# Patient Record
Sex: MALE | Race: WHITE | HISPANIC OR LATINO
[De-identification: names, ages, dates, MRNs, and addresses within clinical notes are randomized per-mention and may not be internally consistent; named-entity substitution may affect disease eponyms.]

---

## 2018-08-28 ENCOUNTER — TRANSCRIPTION ENCOUNTER (OUTPATIENT)
Age: 53
End: 2018-08-28

## 2019-06-21 ENCOUNTER — TRANSCRIPTION ENCOUNTER (OUTPATIENT)
Age: 54
End: 2019-06-21

## 2023-12-31 ENCOUNTER — NON-APPOINTMENT (OUTPATIENT)
Age: 58
End: 2023-12-31

## 2024-01-30 ENCOUNTER — APPOINTMENT (OUTPATIENT)
Dept: CARDIOLOGY | Facility: CLINIC | Age: 59
End: 2024-01-30
Payer: MEDICARE

## 2024-01-30 ENCOUNTER — NON-APPOINTMENT (OUTPATIENT)
Age: 59
End: 2024-01-30

## 2024-01-30 VITALS
HEIGHT: 62 IN | SYSTOLIC BLOOD PRESSURE: 193 MMHG | BODY MASS INDEX: 31.65 KG/M2 | DIASTOLIC BLOOD PRESSURE: 96 MMHG | HEART RATE: 58 BPM | OXYGEN SATURATION: 98 % | WEIGHT: 172 LBS

## 2024-01-30 DIAGNOSIS — Z94.0 KIDNEY TRANSPLANT STATUS: ICD-10-CM

## 2024-01-30 DIAGNOSIS — Z87.891 PERSONAL HISTORY OF NICOTINE DEPENDENCE: ICD-10-CM

## 2024-01-30 DIAGNOSIS — K21.9 GASTRO-ESOPHAGEAL REFLUX DISEASE W/OUT ESOPHAGITIS: ICD-10-CM

## 2024-01-30 DIAGNOSIS — R00.2 PALPITATIONS: ICD-10-CM

## 2024-01-30 DIAGNOSIS — Z82.49 FAMILY HISTORY OF ISCHEMIC HEART DISEASE AND OTHER DISEASES OF THE CIRCULATORY SYSTEM: ICD-10-CM

## 2024-01-30 PROCEDURE — 93000 ELECTROCARDIOGRAM COMPLETE: CPT

## 2024-01-30 PROCEDURE — G2211 COMPLEX E/M VISIT ADD ON: CPT

## 2024-01-30 PROCEDURE — 99205 OFFICE O/P NEW HI 60 MIN: CPT

## 2024-01-30 RX ORDER — PANTOPRAZOLE 40 MG/1
40 TABLET, DELAYED RELEASE ORAL DAILY
Qty: 30 | Refills: 2 | Status: ACTIVE | COMMUNITY
Start: 2024-01-30

## 2024-01-30 RX ORDER — VIT B COMPLX C/FOLIC ACID/ZINC 0.8-12.5MG
TABLET ORAL DAILY
Refills: 0 | Status: ACTIVE | COMMUNITY
Start: 2024-01-30

## 2024-01-30 RX ORDER — ATORVASTATIN CALCIUM 10 MG/1
10 TABLET, FILM COATED ORAL
Refills: 0 | Status: ACTIVE | COMMUNITY
Start: 2024-01-30

## 2024-01-30 NOTE — HISTORY OF PRESENT ILLNESS
[FreeTextEntry1] : This is a 58  year old man with a history of ESRD on HD MWF, failed renal transplant, left AV fistula, s/p stent to the fistula, obesity, hypertension, hyperlipidemia who presents to the office for a cardiac evaluation.  Last week, when he finished one of his HD sessions, he felt as if his heart were racing.  He had a mild sternal chest pressure.  He was taken off the machine, and sent home, at which point he was taken to [facility].  He had negative troponin, EKG, CK, CXR.  He had a positive d dimer, and had a negative CT PA for PE.  He was discharged with follow up.  He has not had any recurrent symptoms.  No PND, orthopnea, LE swelling, dizziness, or syncope.  He has not had any recent cardiac testing.   He holds his AM labetalol on HD days as his BP gets too low with HD.  His BP is controlled on non HD days on his current dose of labetalol.

## 2024-01-30 NOTE — PHYSICAL EXAM
[Well Developed] : well developed [Well Nourished] : well nourished [No Acute Distress] : no acute distress [Normal Conjunctiva] : normal conjunctiva [Normal Venous Pressure] : normal venous pressure [No Carotid Bruit] : no carotid bruit [Normal S1, S2] : normal S1, S2 [No Rub] : no rub [No Gallop] : no gallop [Clear Lung Fields] : clear lung fields [Good Air Entry] : good air entry [No Respiratory Distress] : no respiratory distress  [Soft] : abdomen soft [Non Tender] : non-tender [No Masses/organomegaly] : no masses/organomegaly [Normal Bowel Sounds] : normal bowel sounds [Normal Gait] : normal gait [No Edema] : no edema [No Cyanosis] : no cyanosis [No Clubbing] : no clubbing [No Varicosities] : no varicosities [No Rash] : no rash [No Skin Lesions] : no skin lesions [Moves all extremities] : moves all extremities [No Focal Deficits] : no focal deficits [Normal Speech] : normal speech [Alert and Oriented] : alert and oriented [Normal memory] : normal memory [de-identified] : 1/6 systolic murmur

## 2024-01-30 NOTE — DISCUSSION/SUMMARY
[FreeTextEntry1] : This is a 58  year old man with a history of ESRD on HD MWF, failed renal transplant, left AV fistula, s/p stent to the fistula, obesity, hypertension, hyperlipidemia who presents to the office for a cardiac evaluation.  Last week, when he finished one of his HD sessions, he felt as if his heart were racing.  He had a mild sternal chest pressure.  He was taken off the machine, and sent home, at which point he was taken to [facility].  He had negative troponin, EKG, CK, CXR.  He had a positive d dimer, and had a negative CT PA for PE.  He was discharged with follow up.  I am referring him  for an echocardiogram to assess His cardiac structure and function, as well as a pharmacologic stress test to assess for the presence of obstructive coronary artery disease.  He needs a two week Zio.  He will continue labetalol 200 bid.  He will monitor his BP on non HD days to assure that it is controlled on this dose.  He will continue his statin drug. I will call him with the results.  He will follow with me in 2-3 months to review the above, and review his BP log.  [EKG obtained to assist in diagnosis and management of assessed problem(s)] : EKG obtained to assist in diagnosis and management of assessed problem(s)

## 2024-02-16 ENCOUNTER — NON-APPOINTMENT (OUTPATIENT)
Age: 59
End: 2024-02-16

## 2024-03-04 ENCOUNTER — NON-APPOINTMENT (OUTPATIENT)
Age: 59
End: 2024-03-04

## 2024-03-06 ENCOUNTER — APPOINTMENT (OUTPATIENT)
Dept: CARDIOLOGY | Facility: CLINIC | Age: 59
End: 2024-03-06

## 2024-03-06 ENCOUNTER — APPOINTMENT (OUTPATIENT)
Dept: CARDIOLOGY | Facility: CLINIC | Age: 59
End: 2024-03-06
Payer: MEDICARE

## 2024-03-06 ENCOUNTER — RX RENEWAL (OUTPATIENT)
Age: 59
End: 2024-03-06

## 2024-03-06 ENCOUNTER — NON-APPOINTMENT (OUTPATIENT)
Age: 59
End: 2024-03-06

## 2024-03-06 VITALS
OXYGEN SATURATION: 96 % | HEIGHT: 62 IN | DIASTOLIC BLOOD PRESSURE: 71 MMHG | WEIGHT: 177 LBS | BODY MASS INDEX: 32.57 KG/M2 | SYSTOLIC BLOOD PRESSURE: 120 MMHG | HEART RATE: 148 BPM

## 2024-03-06 PROCEDURE — 93000 ELECTROCARDIOGRAM COMPLETE: CPT

## 2024-03-06 PROCEDURE — 93306 TTE W/DOPPLER COMPLETE: CPT

## 2024-03-06 PROCEDURE — G2211 COMPLEX E/M VISIT ADD ON: CPT

## 2024-03-06 PROCEDURE — 99215 OFFICE O/P EST HI 40 MIN: CPT

## 2024-03-06 RX ORDER — LABETALOL HYDROCHLORIDE 200 MG/1
200 TABLET, FILM COATED ORAL TWICE DAILY
Qty: 60 | Refills: 3 | Status: DISCONTINUED | COMMUNITY
Start: 2024-01-30 | End: 2024-03-06

## 2024-03-06 NOTE — PHYSICAL EXAM
[Well Developed] : well developed [Well Nourished] : well nourished [No Acute Distress] : no acute distress [Normal Conjunctiva] : normal conjunctiva [Normal Venous Pressure] : normal venous pressure [Normal S1, S2] : normal S1, S2 [No Carotid Bruit] : no carotid bruit [No Gallop] : no gallop [No Rub] : no rub [Clear Lung Fields] : clear lung fields [Good Air Entry] : good air entry [Soft] : abdomen soft [No Respiratory Distress] : no respiratory distress  [No Masses/organomegaly] : no masses/organomegaly [Non Tender] : non-tender [Normal Bowel Sounds] : normal bowel sounds [Normal Gait] : normal gait [No Edema] : no edema [No Clubbing] : no clubbing [No Cyanosis] : no cyanosis [No Varicosities] : no varicosities [No Skin Lesions] : no skin lesions [No Rash] : no rash [No Focal Deficits] : no focal deficits [Moves all extremities] : moves all extremities [Normal Speech] : normal speech [Alert and Oriented] : alert and oriented [Normal memory] : normal memory [Tachycardia] : tachycardic [Irregularly Irregular] : irregularly irregular [I] : a grade 1

## 2024-03-12 RX ORDER — CARVEDILOL 25 MG/1
25 TABLET, FILM COATED ORAL TWICE DAILY
Qty: 180 | Refills: 3 | Status: ACTIVE | COMMUNITY
Start: 2024-03-06 | End: 1900-01-01

## 2024-03-26 ENCOUNTER — RX RENEWAL (OUTPATIENT)
Age: 59
End: 2024-03-26

## 2024-03-26 ENCOUNTER — APPOINTMENT (OUTPATIENT)
Dept: CARDIOLOGY | Facility: CLINIC | Age: 59
End: 2024-03-26
Payer: MEDICARE

## 2024-03-26 ENCOUNTER — NON-APPOINTMENT (OUTPATIENT)
Age: 59
End: 2024-03-26

## 2024-03-26 VITALS
SYSTOLIC BLOOD PRESSURE: 175 MMHG | BODY MASS INDEX: 32.39 KG/M2 | OXYGEN SATURATION: 98 % | DIASTOLIC BLOOD PRESSURE: 97 MMHG | HEIGHT: 62 IN | WEIGHT: 176 LBS | HEART RATE: 61 BPM

## 2024-03-26 PROCEDURE — 93000 ELECTROCARDIOGRAM COMPLETE: CPT

## 2024-03-26 PROCEDURE — 99215 OFFICE O/P EST HI 40 MIN: CPT

## 2024-03-26 PROCEDURE — G2211 COMPLEX E/M VISIT ADD ON: CPT

## 2024-03-26 NOTE — REVIEW OF SYSTEMS
[SOB] : no shortness of breath [Chest Discomfort] : chest discomfort [Lower Ext Edema] : no extremity edema [Leg Claudication] : no intermittent leg claudication [Palpitations] : palpitations [Orthopnea] : no orthopnea [PND] : no PND [Syncope] : no syncope [Negative] : Heme/Lymph

## 2024-03-26 NOTE — DISCUSSION/SUMMARY
[FreeTextEntry1] : This is a 59  year old man with a history of ESRD on HD MWF(uncontrolled HTN), renal transplant 3/18/14, 12/2014 failed renal transplant (on transplant list at [place]),left AV fistula, s/p stent to the fistula, obesity, hypertension, hyperlipidemia who presents to the office for a cardiac evaluation.  HE has been feeling his heart racing with HD at times.       At his last visit, he was here for a stress test, and was noted to be in AFL at 150.  He refused to go to the ED for cardioversion.  He was given carvedilol 25 bid.  He is now back in NSR.    He is going to reschedule his nuclear stress test.  He will make an appointment to see EPS.  He does not want to start blood thinners yet.  His BP is uncontrolled, and I am starting hydralazine 25 TID.     I will see him back in one month.     [EKG obtained to assist in diagnosis and management of assessed problem(s)] : EKG obtained to assist in diagnosis and management of assessed problem(s)

## 2024-03-26 NOTE — PHYSICAL EXAM
[Well Developed] : well developed [Well Nourished] : well nourished [No Acute Distress] : no acute distress [Normal Conjunctiva] : normal conjunctiva [Normal Venous Pressure] : normal venous pressure [No Carotid Bruit] : no carotid bruit [Normal S1, S2] : normal S1, S2 [No Rub] : no rub [No Gallop] : no gallop [Normal Rate] : normal [Rhythm Regular] : regular [I] : a grade 1 [Right Carotid Bruit] : no bruit heard over the right carotid [Left Carotid Bruit] : no bruit heard over the left carotid [Clear Lung Fields] : clear lung fields [Good Air Entry] : good air entry [No Respiratory Distress] : no respiratory distress  [Soft] : abdomen soft [Non Tender] : non-tender [No Masses/organomegaly] : no masses/organomegaly [Normal Bowel Sounds] : normal bowel sounds [Normal Gait] : normal gait [No Edema] : no edema [No Cyanosis] : no cyanosis [No Clubbing] : no clubbing [No Rash] : no rash [No Varicosities] : no varicosities [No Skin Lesions] : no skin lesions [Moves all extremities] : moves all extremities [No Focal Deficits] : no focal deficits [Normal Speech] : normal speech [Alert and Oriented] : alert and oriented [Normal memory] : normal memory [de-identified] : 1/6 systolic murmur

## 2024-03-26 NOTE — HISTORY OF PRESENT ILLNESS
[FreeTextEntry1] : This is a 59  year old man with a history of ESRD on HD MWF(uncontrolled HTN), renal transplant 3/18/14, 12/2014 failed renal transplant (on transplant list at [place]),left AV fistula, s/p stent to the fistula, obesity, hypertension, hyperlipidemia who presents to the office for a cardiac evaluation.  HE has been feeling his heart racing with HD at times.   He was taken off the machine, and sent home, at which point he was taken to [facility].  He had negative troponin, EKG, CK, CXR.  He had a positive d dimer, and had a negative CT PA for PE.  He was discharged with follow up.  He has not had any recurrent symptoms.     At his last visit, he was here for a stress test, and was noted to be in AFL at 150.  He refused to go to the ED for cardioversion.  He was given carvedilol 25 bid.  He is now back in NSR.      He just completed a 2 week [facility] ambulatory rhythm monitor that did not reveal any episodes of atrial fibrillation.

## 2024-03-29 ENCOUNTER — EMERGENCY (EMERGENCY)
Facility: HOSPITAL | Age: 59
LOS: 1 days | Discharge: HOME | End: 2024-03-29
Attending: EMERGENCY MEDICINE | Admitting: EMERGENCY MEDICINE
Payer: MEDICARE

## 2024-03-29 ENCOUNTER — APPOINTMENT (OUTPATIENT)
Dept: CARDIOLOGY | Facility: CLINIC | Age: 59
End: 2024-03-29
Payer: MEDICARE

## 2024-03-29 ENCOUNTER — APPOINTMENT (OUTPATIENT)
Dept: CARDIOLOGY | Facility: CLINIC | Age: 59
End: 2024-03-29

## 2024-03-29 VITALS
WEIGHT: 169.98 LBS | SYSTOLIC BLOOD PRESSURE: 159 MMHG | HEART RATE: 145 BPM | OXYGEN SATURATION: 100 % | RESPIRATION RATE: 18 BRPM | TEMPERATURE: 98 F | DIASTOLIC BLOOD PRESSURE: 93 MMHG

## 2024-03-29 VITALS — DIASTOLIC BLOOD PRESSURE: 106 MMHG | HEART RATE: 153 BPM | SYSTOLIC BLOOD PRESSURE: 182 MMHG

## 2024-03-29 VITALS
SYSTOLIC BLOOD PRESSURE: 148 MMHG | HEART RATE: 74 BPM | RESPIRATION RATE: 18 BRPM | DIASTOLIC BLOOD PRESSURE: 88 MMHG | TEMPERATURE: 98 F | OXYGEN SATURATION: 100 %

## 2024-03-29 VITALS — DIASTOLIC BLOOD PRESSURE: 97 MMHG | SYSTOLIC BLOOD PRESSURE: 177 MMHG

## 2024-03-29 VITALS — SYSTOLIC BLOOD PRESSURE: 145 MMHG | DIASTOLIC BLOOD PRESSURE: 107 MMHG

## 2024-03-29 VITALS — HEART RATE: 160 BPM

## 2024-03-29 LAB
ALBUMIN SERPL ELPH-MCNC: 3.3 G/DL — SIGNIFICANT CHANGE UP (ref 3.3–5)
ALP SERPL-CCNC: 101 U/L — SIGNIFICANT CHANGE UP (ref 40–120)
ALT FLD-CCNC: 72 U/L — SIGNIFICANT CHANGE UP (ref 12–78)
ANION GAP SERPL CALC-SCNC: 10 MMOL/L — SIGNIFICANT CHANGE UP (ref 5–17)
AST SERPL-CCNC: 42 U/L — HIGH (ref 15–37)
BASOPHILS # BLD AUTO: 0.01 K/UL — SIGNIFICANT CHANGE UP (ref 0–0.2)
BASOPHILS NFR BLD AUTO: 0.2 % — SIGNIFICANT CHANGE UP (ref 0–2)
BILIRUB SERPL-MCNC: 1 MG/DL — SIGNIFICANT CHANGE UP (ref 0.2–1.2)
BUN SERPL-MCNC: 19 MG/DL — SIGNIFICANT CHANGE UP (ref 7–23)
CALCIUM SERPL-MCNC: 8.4 MG/DL — LOW (ref 8.5–10.1)
CHLORIDE SERPL-SCNC: 101 MMOL/L — SIGNIFICANT CHANGE UP (ref 96–108)
CO2 SERPL-SCNC: 29 MMOL/L — SIGNIFICANT CHANGE UP (ref 22–31)
CREAT SERPL-MCNC: 5.4 MG/DL — HIGH (ref 0.5–1.3)
D DIMER BLD IA.RAPID-MCNC: 493 NG/ML DDU — HIGH
EGFR: 12 ML/MIN/1.73M2 — LOW
EOSINOPHIL # BLD AUTO: 0.09 K/UL — SIGNIFICANT CHANGE UP (ref 0–0.5)
EOSINOPHIL NFR BLD AUTO: 1.6 % — SIGNIFICANT CHANGE UP (ref 0–6)
GLUCOSE SERPL-MCNC: 81 MG/DL — SIGNIFICANT CHANGE UP (ref 70–99)
HCT VFR BLD CALC: 34.2 % — LOW (ref 39–50)
HGB BLD-MCNC: 11.6 G/DL — LOW (ref 13–17)
IMM GRANULOCYTES NFR BLD AUTO: 0.4 % — SIGNIFICANT CHANGE UP (ref 0–0.9)
LYMPHOCYTES # BLD AUTO: 0.93 K/UL — LOW (ref 1–3.3)
LYMPHOCYTES # BLD AUTO: 16.8 % — SIGNIFICANT CHANGE UP (ref 13–44)
MCHC RBC-ENTMCNC: 30.6 PG — SIGNIFICANT CHANGE UP (ref 27–34)
MCHC RBC-ENTMCNC: 33.9 GM/DL — SIGNIFICANT CHANGE UP (ref 32–36)
MCV RBC AUTO: 90.2 FL — SIGNIFICANT CHANGE UP (ref 80–100)
MONOCYTES # BLD AUTO: 0.37 K/UL — SIGNIFICANT CHANGE UP (ref 0–0.9)
MONOCYTES NFR BLD AUTO: 6.7 % — SIGNIFICANT CHANGE UP (ref 2–14)
NEUTROPHILS # BLD AUTO: 4.1 K/UL — SIGNIFICANT CHANGE UP (ref 1.8–7.4)
NEUTROPHILS NFR BLD AUTO: 74.3 % — SIGNIFICANT CHANGE UP (ref 43–77)
NRBC # BLD: 0 /100 WBCS — SIGNIFICANT CHANGE UP (ref 0–0)
NT-PROBNP SERPL-SCNC: HIGH PG/ML (ref 0–125)
PLATELET # BLD AUTO: 143 K/UL — LOW (ref 150–400)
POTASSIUM SERPL-MCNC: 4.1 MMOL/L — SIGNIFICANT CHANGE UP (ref 3.5–5.3)
POTASSIUM SERPL-SCNC: 4.1 MMOL/L — SIGNIFICANT CHANGE UP (ref 3.5–5.3)
PROT SERPL-MCNC: 8.1 G/DL — SIGNIFICANT CHANGE UP (ref 6–8.3)
RBC # BLD: 3.79 M/UL — LOW (ref 4.2–5.8)
RBC # FLD: 12.8 % — SIGNIFICANT CHANGE UP (ref 10.3–14.5)
SODIUM SERPL-SCNC: 140 MMOL/L — SIGNIFICANT CHANGE UP (ref 135–145)
TROPONIN I, HIGH SENSITIVITY RESULT: 33.9 NG/L — SIGNIFICANT CHANGE UP
TSH SERPL-MCNC: <0 UIU/ML — LOW (ref 0.36–3.74)
WBC # BLD: 5.52 K/UL — SIGNIFICANT CHANGE UP (ref 3.8–10.5)
WBC # FLD AUTO: 5.52 K/UL — SIGNIFICANT CHANGE UP (ref 3.8–10.5)

## 2024-03-29 PROCEDURE — 36415 COLL VENOUS BLD VENIPUNCTURE: CPT

## 2024-03-29 PROCEDURE — 83880 ASSAY OF NATRIURETIC PEPTIDE: CPT

## 2024-03-29 PROCEDURE — 84484 ASSAY OF TROPONIN QUANT: CPT

## 2024-03-29 PROCEDURE — 99285 EMERGENCY DEPT VISIT HI MDM: CPT | Mod: 25

## 2024-03-29 PROCEDURE — 93010 ELECTROCARDIOGRAM REPORT: CPT

## 2024-03-29 PROCEDURE — 71045 X-RAY EXAM CHEST 1 VIEW: CPT | Mod: 26

## 2024-03-29 PROCEDURE — 85379 FIBRIN DEGRADATION QUANT: CPT

## 2024-03-29 PROCEDURE — 99285 EMERGENCY DEPT VISIT HI MDM: CPT

## 2024-03-29 PROCEDURE — 84443 ASSAY THYROID STIM HORMONE: CPT

## 2024-03-29 PROCEDURE — 93000 ELECTROCARDIOGRAM COMPLETE: CPT

## 2024-03-29 PROCEDURE — 80053 COMPREHEN METABOLIC PANEL: CPT

## 2024-03-29 PROCEDURE — 93005 ELECTROCARDIOGRAM TRACING: CPT

## 2024-03-29 PROCEDURE — 96374 THER/PROPH/DIAG INJ IV PUSH: CPT

## 2024-03-29 PROCEDURE — 85025 COMPLETE CBC W/AUTO DIFF WBC: CPT

## 2024-03-29 PROCEDURE — 71045 X-RAY EXAM CHEST 1 VIEW: CPT

## 2024-03-29 RX ORDER — APIXABAN 2.5 MG/1
1 TABLET, FILM COATED ORAL
Qty: 60 | Refills: 0
Start: 2024-03-29 | End: 2024-04-27

## 2024-03-29 RX ORDER — APIXABAN 2.5 MG/1
5 TABLET, FILM COATED ORAL ONCE
Refills: 0 | Status: COMPLETED | OUTPATIENT
Start: 2024-03-29 | End: 2024-03-29

## 2024-03-29 RX ORDER — DILTIAZEM HCL 120 MG
10 CAPSULE, EXT RELEASE 24 HR ORAL ONCE
Refills: 0 | Status: COMPLETED | OUTPATIENT
Start: 2024-03-29 | End: 2024-03-29

## 2024-03-29 RX ORDER — DILTIAZEM HCL 120 MG
10 CAPSULE, EXT RELEASE 24 HR ORAL
Qty: 125 | Refills: 0 | Status: DISCONTINUED | OUTPATIENT
Start: 2024-03-29 | End: 2024-04-01

## 2024-03-29 RX ADMIN — Medication 10 MILLIGRAM(S): at 12:31

## 2024-03-29 RX ADMIN — APIXABAN 5 MILLIGRAM(S): 2.5 TABLET, FILM COATED ORAL at 16:05

## 2024-03-29 NOTE — CONSULT NOTE ADULT - ASSESSMENT
NOTE IN PROGRESS     59 year old man with a history of ESRD on HD MWF(uncontrolled HTN), renal transplant 3/18/14, 12/2014 failed renal transplant (on transplant list at [place]),left AV fistula, s/p stent to the fistula, obesity, hypertension, hyperlipidemia sent from cardiology office for Afib RVR.             59 year old man with a history of ESRD on HD MWF(uncontrolled HTN), renal transplant 3/18/14, 12/2014 failed renal transplant (on transplant list at [place]),left AV fistula, s/p stent to the fistula, obesity, hypertension, hyperlipidemia sent from cardiology office for Afib RVR.    Afib RVR  - found to be in Afib RVR s/p HD. multiple occurrence per pt, sent from cardiologist office s/p metoprolol 5mg ivp  - Initial EKG  in Ed shows Afib RVR 135bpm  - s/p cardizem 10mg ivp, now in SR  - continue home BB  - start on full dose AC, start on Eliquis 5mgg bid. pt is now agreeable to AC  - Dr. Mohan saw pt today in the office. he is amenable, a DAYTON/DCCV could be done as well vs rate control for now and AF ablation with EP in the future. Plan to follow with Dr. Alvarez in 2 weeks  - TSH is undetectable, will need to be followup outpt    - TTE 3/7/24 hyperdynamic LV with EF 70 to 75 %. There are no regional wall motion abnormalities seen.  mild-mod tricuspid regurgitation. borderline pulmonary hypertension.  - denied anginal complaints  - continue ASA and statin    - elevated probnp  - no evidence of significant volume overload  - volume removal per HD    - Follows with Dr. Schwartz. stable for dc on cv standpoint.  - Monitor and replete lytes, keep K>4, Mg>2.  - Will continue to follow.    Cesario Solorio NP  Nurse Practitioner- Cardiology   Call TEAMS             59 year old man with a history of ESRD on HD MWF(uncontrolled HTN), renal transplant 3/18/14, 12/2014 failed renal transplant (on transplant list at [place]),left AV fistula, s/p stent to the fistula, obesity, hypertension, hyperlipidemia sent from cardiology office for Afib RVR.    Afib RVR  - found to be in Afib RVR s/p HD. multiple occurrence per pt, sent from cardiologist office s/p metoprolol 5mg ivp  - Initial EKG  in Ed shows Afib RVR 135bpm  - s/p cardizem 10mg ivp, now in SR  - continue home BB  - start on full dose AC, start on Eliquis 5mg BID. pt is now agreeable to AC  - Dr. Mohan saw pt today in the office. he is amenable, a DAYTON/DCCV could be done as well vs rate control for now and AF ablation with EP in the future. Plan to follow with Dr. Alvarez in 2 weeks  - TSH is undetectable, will need to be followup outpt    - TTE 3/7/24 hyperdynamic LV with EF 70 to 75 %. There are no regional wall motion abnormalities seen.  mild-mod tricuspid regurgitation. borderline pulmonary hypertension.  - denied anginal complaints  - continue ASA and statin    - elevated probnp  - no evidence of significant volume overload  - volume removal per HD    - Follows with Dr. Schwartz. stable for dc on cv standpoint.  - Monitor and replete lytes, keep K>4, Mg>2.  - Will continue to follow.    Cesario Solorio NP  Nurse Practitioner- Cardiology   Call TEAMS

## 2024-03-29 NOTE — ED ADULT NURSE NOTE - NSFALLUNIVINTERV_ED_ALL_ED
Bed/Stretcher in lowest position, wheels locked, appropriate side rails in place/Call bell, personal items and telephone in reach/Instruct patient to call for assistance before getting out of bed/chair/stretcher/Non-slip footwear applied when patient is off stretcher/[place] to call system/Physically safe environment - no spills, clutter or unnecessary equipment/Purposeful proactive rounding/Room/bathroom lighting operational, light cord in reach

## 2024-03-29 NOTE — ED PROVIDER NOTE - OBJECTIVE STATEMENT
59-year-old male with significant past medical history for end-stage renal disease on hemodialysis sent in from cardiology office for rapid A-fib status post dialysis today.  Patient also with history of uncontrolled hypertension, failed renal transplant, hyperlipidemia t.  Patient previously with history of atrial flutter on cardiac stress test.  Patient complaining of palpitations but denies chest pain.  Patient with right chest wall radiating to the right back shingles, healing.

## 2024-03-29 NOTE — ED PROVIDER NOTE - CLINICAL SUMMARY MEDICAL DECISION MAKING FREE TEXT BOX
59-year-old male with significant past medical history for end-stage renal disease, hypertension, hyperlipidemia, atrial flutter now in rapid A-fib sent in by cardiology for rate control.  Cardiac labs, check electrolytes, rate control, cardio.

## 2024-03-29 NOTE — ED PROVIDER NOTE - CARE PROVIDER_API CALL
Jim Schwartz  Cardiology  [address] [place] [address]  Phone: [phone]  Fax: [phone]  Follow Up Time:

## 2024-03-29 NOTE — ED PROVIDER NOTE - PATIENT PORTAL LINK FT
You can access the FollowMyHealth Patient Portal offered by [facility] by registering at the following website: [email or web address]. By joining [facility]’s FollowMyHealth portal, you will also be able to view your health information using other applications (apps) compatible with our system.

## 2024-03-29 NOTE — CONSULT NOTE ADULT - SUBJECTIVE AND OBJECTIVE BOX
[facility] - Peg Wright,  Stefany, Jean Schwartz Goodger, Marques Mccarty  Office Number: [phone]    Initial Consult Note    CHIEF COMPLAINT: Patient is a 59y old  Male who presents with a chief complaint of     HPI: 59 year old man with a history of ESRD on HD MWF(uncontrolled HTN), renal transplant 3/18/14, 12/2014 failed renal transplant (on transplant list at [place]),left AV fistula, s/p stent to the fistula, obesity, hypertension, hyperlipidemia who presented to the cardiology office today for palpitations. He states that he was at home and his HR was jumping up and down and so he presented today for an EKG. Multiple EKGs done which showed AF in the 160s. We advised that he present to the ER for further mgmt but he refused. An IV was placed and he was given IVF and 5mg IVP of Metoprolol but his HR increased again to the 160s. He remained stable and without symptoms during this time.  He was advised to go to ER for further workup.     Of note, at a previous visit, he was here for a stress test and was found to be in atrial flutter to 150 and refused to go to the ER for DCCV. He did not want to start AC at that time      PAST MEDICAL & SURGICAL HISTORY:    SOCIAL HISTORY:  No tobacco, ethanol, or drug abuse.  FAMILY HISTORY:    No family history of acute MI or sudden cardiac death.  MEDICATIONS  (STANDING):  diltiazem Infusion 10 mG/Hr (10 mL/Hr) IV Continuous <Continuous>  diltiazem Injectable 10 milliGRAM(s) IV Push once    MEDICATIONS  (PRN):    Allergies    No Known Allergies    Intolerances      REVIEW OF SYSTEMS:  All other review of systems is negative unless indicated above  VITAL SIGNS:   Vital Signs Last 24 Hrs  T(C): 36.7 (29 Mar 2024 12:01), Max: 36.7 (29 Mar 2024 12:01)  T(F): 98.1 (29 Mar 2024 12:01), Max: 98.1 (29 Mar 2024 12:01)  HR: 145 (29 Mar 2024 12:01) (145 - 145)  BP: 159/93 (29 Mar 2024 12:01) (159/93 - 159/93)  BP(mean): --  RR: 18 (29 Mar 2024 12:01) (18 - 18)  SpO2: 100% (29 Mar 2024 12:01) (100% - 100%)    Parameters below as of 29 Mar 2024 12:01  Patient On (Oxygen Delivery Method): room air      I&O's Summary    On Exam:  Constitutional: NAD, alert and oriented x 3  Lungs:  Non-labored, breath sounds are clear bilaterally, No wheezing, rales or rhonchi  Cardiovascular: IRRR.  S1 and S2 positive.  No murmurs, rubs, gallops or clicks  Gastrointestinal: Bowel Sounds present, soft, nontender.   Extremities: No peripheral edema.   Neurological: Alert, no focal deficits  Skin: No rashes or ulcers   Psych:  Mood & affect appropriate.    LABS: All Labs Reviewed:          Blood Culture:         RADIOLOGY:    EKG:       [facility] - Peg Wright,  Stefany, Jean Schwartz Goodger, Marques Mccarty  Office Number: [phone]    Initial Consult Note    CHIEF COMPLAINT: Patient is a 59y old  Male who presents with a chief complaint of     HPI: 59 year old man with a history of ESRD on HD MWF(uncontrolled HTN), renal transplant 3/18/14, 12/2014 failed renal transplant (on transplant list at [place]),left AV fistula, s/p stent to the fistula, obesity, hypertension, hyperlipidemia who presented to the cardiology office today for palpitations. He states that he was at home and his HR was jumping up and down and so he presented today for an EKG. Multiple EKGs done which showed AF in the 160s. We advised that he present to the ER for further mgmt but he refused. An IV was placed and he was given IVF and 5mg IVP of Metoprolol but his HR increased again to the 160s. He remained stable and without symptoms during this time.  He was advised to go to ER for further workup.     Of note, at a previous visit, he was here for a stress test and was found to be in atrial flutter to 150 and refused to go to the ER for DCCV. He did not want to start AC at that time.      PAST MEDICAL & SURGICAL HISTORY:    SOCIAL HISTORY:  No tobacco, ethanol, or drug abuse.  FAMILY HISTORY:    No family history of acute MI or sudden cardiac death.  MEDICATIONS  (STANDING):  diltiazem Infusion 10 mG/Hr (10 mL/Hr) IV Continuous <Continuous>  diltiazem Injectable 10 milliGRAM(s) IV Push once    MEDICATIONS  (PRN):    Allergies    No Known Allergies    Intolerances      REVIEW OF SYSTEMS:  All other review of systems is negative unless indicated above  VITAL SIGNS:   Vital Signs Last 24 Hrs  T(C): 36.7 (29 Mar 2024 12:01), Max: 36.7 (29 Mar 2024 12:01)  T(F): 98.1 (29 Mar 2024 12:01), Max: 98.1 (29 Mar 2024 12:01)  HR: 145 (29 Mar 2024 12:01) (145 - 145)  BP: 159/93 (29 Mar 2024 12:01) (159/93 - 159/93)  BP(mean): --  RR: 18 (29 Mar 2024 12:01) (18 - 18)  SpO2: 100% (29 Mar 2024 12:01) (100% - 100%)    Parameters below as of 29 Mar 2024 12:01  Patient On (Oxygen Delivery Method): room air      I&O's Summary    On Exam:  Constitutional: NAD, alert and oriented x 3  Lungs:  Non-labored, breath sounds are clear bilaterally, No wheezing, rales or rhonchi  Cardiovascular: RRR.  S1 and S2 positive.  No murmurs, rubs, gallops or clicks  Gastrointestinal: Bowel Sounds present, soft, nontender.   Extremities: No peripheral edema.   Neurological: Alert, no focal deficits  Skin:+ blister weeping in chest ( shingles),  No rashes or ulcers   Psych:  Mood & affect appropriate.    LABS: All Labs Reviewed:          Blood Culture:         RADIOLOGY:    EKG:

## 2024-03-29 NOTE — REVIEW OF SYSTEMS
[SOB] : no shortness of breath [Dyspnea on exertion] : not dyspnea during exertion [Chest Discomfort] : no chest discomfort [Palpitations] : palpitations [Negative] : Heme/Lymph

## 2024-03-29 NOTE — REASON FOR VISIT
[Symptom and Test Evaluation] : symptom and test evaluation [Arrhythmia/ECG Abnorrmalities] : arrhythmia/ECG abnormalities [Hyperlipidemia] : hyperlipidemia [Hypertension] : hypertension [FreeTextEntry1] : AF with RVR

## 2024-03-29 NOTE — PHYSICAL EXAM
[Well Nourished] : well nourished [Well Developed] : well developed [No Acute Distress] : no acute distress [Normal Conjunctiva] : normal conjunctiva [Normal Venous Pressure] : normal venous pressure [No Carotid Bruit] : no carotid bruit [No Murmur] : no murmur [No Rub] : no rub [No Gallop] : no gallop [Clear Lung Fields] : clear lung fields [No Respiratory Distress] : no respiratory distress  [Good Air Entry] : good air entry [Soft] : abdomen soft [Non Tender] : non-tender [No Masses/organomegaly] : no masses/organomegaly [Normal Gait] : normal gait [Normal Bowel Sounds] : normal bowel sounds [No Edema] : no edema [No Cyanosis] : no cyanosis [No Clubbing] : no clubbing [No Varicosities] : no varicosities [No Rash] : no rash [No Skin Lesions] : no skin lesions [Moves all extremities] : moves all extremities [No Focal Deficits] : no focal deficits [Alert and Oriented] : alert and oriented [Normal Speech] : normal speech [Normal memory] : normal memory [de-identified] : tachycardic

## 2024-03-29 NOTE — DISCUSSION/SUMMARY
[FreeTextEntry1] : Tom is a 59 year old man with a history of ESRD on HD MWF(uncontrolled HTN), renal transplant 3/18/14, 12/2014 failed renal transplant (on transplant list at [place]),left AV fistula, s/p stent to the fistula, obesity, hypertension, hyperlipidemia who presented to the office today for palpitations.   He states that he was at home and his HR was jumping up and down and so he presented today for an EKG. Multiple EKGs done which showed AF in the 160s. We advised that he present to the ER for further mgmt but he refused. An IV was placed and he was given IVF and 5mg IVP of Metoprolol but his HR increased again to the 160s. He remained stable and without symptoms during this time.  After much discussion and persuasion, he has agreed to present to the [facility] ER for further mgmt.   Of note, at a previous visit, he was here for a stress test and was found to be in atrial flutter to 150 and refused to go to the ER for DCCV. He did not want to start AC at that time  Will need initiation of AC and rate controlling agents (though already on Coreg 25mg BID). If he is amenable, a DAYTON/DCCV could be done as well vs rate control for now and AF ablation with EP in the future.   Will follow up with Dr. Schwartz after his hospital visit.  [EKG obtained to assist in diagnosis and management of assessed problem(s)] : EKG obtained to assist in diagnosis and management of assessed problem(s)

## 2024-03-29 NOTE — ED PROVIDER NOTE - NSFOLLOWUPINSTRUCTIONS_ED_ALL_ED_FT
Atrial Fibrillation  Atrial fibrillation (AFib) is a type of heartbeat that is irregular or fast. If you have AFib, your heart beats without any order. This makes it hard for your heart to pump blood in a normal way.    AFib may come and go, or it may become a long-lasting problem. If AFib is not treated, it can put you at higher risk for stroke, heart failure, and other heart problems.    What are the causes?  A heart with normal electrical activity and a heart with abnormal electrical activity.  AFib may be caused by diseases that damage the heart's electrical system. They include:  High blood pressure.  Heart failure.  Heart valve diseases.  Heart surgery.  Diabetes.  Thyroid disease.  Kidney disease.  Lung diseases, such as pneumonia or COPD.  Sleep apnea.  Sometimes the cause is not known.    What increases the risk?  You are more likely to develop AFib if:  You are older.  You exercise often and very hard.  You have a family history of AFib.  You are male.  You are .  You are overweight.  You smoke.  You drink a lot of alcohol.  What are the signs or symptoms?  Common symptoms of this condition include:  A feeling that your heart is beating very fast.  Chest pain or discomfort.  Feeling short of breath.  Suddenly feeling light-headed or weak.  Getting tired easily during activity.  Fainting.  Sweating.  In some cases, there are no symptoms.    How is this treated?  Medicines to:  Prevent blood clots.  Treat heart rate or heart rhythm problems.  Using devices, such as a pacemaker, to correct heart rhythm problems.  Doing surgery to remove the part of the heart that sends bad signals.  Closing an area where clots can form in the heart (left atrial appendage).  In some cases, your doctor will treat other underlying conditions.    Follow these instructions at home:  Medicines    Take over-the-counter and prescription medicines only as told by your doctor.  Do not take any new medicines without first talking to your doctor.  If you are taking blood thinners:  Talk with your doctor before taking aspirin or NSAIDs, such as ibuprofen.  Take your medicines as told. Take them at the same time each day.  Do not do things that could hurt or bruise you. Be careful to avoid falls.  Wear an alert bracelet or carry a card that says you take blood thinners.  Lifestyle    Do not smoke or use any products that contain nicotine or tobacco. If you need help quitting, ask your doctor.  Eat heart-healthy foods. Talk with your doctor about the right eating plan for you.  Exercise regularly as told by your doctor.  Do not drink alcohol.  Lose weight if you are overweight.  General instructions    If you have sleep apnea, treat it as told by your doctor.  Do not use diet pills unless your doctor says they are safe for you. Diet pills may make heart problems worse.  Keep all follow-up visits. Your doctor will check your heart rate and rhythm regularly.  Contact a doctor if:  You notice a change in the speed, rhythm, or strength of your heartbeat.  You are taking a blood-thinning medicine and you get more bruising.  You get tired more easily when you move or exercise.  You have a sudden change in weight.  Get help right away if:  A sign showing the "BE FAST" categories for the warning signs of a stroke: balance, eyes, face, arms, speech, and time.   You have pain in your chest.  You have trouble breathing.  You have side effects of blood thinners, such as blood in your vomit, poop (stool), or pee (urine), or bleeding that cannot stop.  You have any signs of a stroke. "BE FAST" is an easy way to remember the main warning signs:  B - Balance. Dizziness, sudden trouble walking, or loss of balance.  E - Eyes. Trouble seeing or a change in how you see.  F - Face. Sudden weakness or loss of feeling in the face. The face or eyelid may droop on one side.  A - Arms.Weakness or loss of feeling in an arm. This happens suddenly and usually on one side of the body.  S - Speech. Sudden trouble speaking, slurred speech, or trouble understanding what people say.  T - Time.Time to call emergency services. Write down what time symptoms started.  You have other signs of a stroke, such as:  A sudden, very bad headache with no known cause.  Feeling like you may vomit (nausea).  Vomiting.  A seizure.  These symptoms may be an emergency. Get help right away. Call 911.  Do not wait to see if the symptoms will go away.  Do not drive yourself to the hospital.  This information is not intended to replace advice given to you by your health care provider. Make sure you discuss any questions you have with your health care provider.    Document Revised: 09/06/2023 Document Reviewed: 09/06/2023  Elsevier Patient Education © 2024 [employer]

## 2024-03-29 NOTE — HISTORY OF PRESENT ILLNESS
[FreeTextEntry1] : Tom is a  59 year old man with a history of ESRD on HD MWF(uncontrolled HTN), renal transplant 3/18/14, 12/2014 failed renal transplant (on transplant list at [place]),left AV fistula, s/p stent to the fistula, obesity, hypertension, hyperlipidemia who presented to the office today for palpitations.   He states that he was at home and his HR was jumping up and down and so he presented today for an EKG. Multiple EKGs done which showed AF in the 160s. We advised that he present to the ER for further mgmt but he refused. An IV was placed and he was given IVF and 5mg IVP of Metoprolol but his HR increased again to the 160s. He remained stable and without symptoms during this time.  After much discussion and persuasion, he has agreed to present to the [facility] for further mgmt.   Of note, at a previous visit, he was here for a stress test and was found to be in atrial flutter to 150 and refused to go to the ER for DCCV. He did not want to start AC at that time

## 2024-03-29 NOTE — CONSULT NOTE ADULT - NS ATTEND AMEND GEN_ALL_CORE FT
59 year old man with a history of ESRD on HD MWF(uncontrolled HTN), renal transplant 3/18/14, 12/2014 failed renal transplant (on transplant list at [place]),left AV fistula, s/p stent to the fistula, obesity, hypertension, hyperlipidemia sent from cardiology office for Afib RVR.    hx of paf rvr  has been noncompliant over time  now with af rvr, given dilt iv  back in sr  has not been on ac now agreeable to start eliquis bid  hyhperthyroid will need to be addressed  has appt to see ep 2w   cont bb

## 2024-04-03 ENCOUNTER — NON-APPOINTMENT (OUTPATIENT)
Age: 59
End: 2024-04-03

## 2024-04-05 ENCOUNTER — APPOINTMENT (OUTPATIENT)
Dept: CARDIOLOGY | Facility: CLINIC | Age: 59
End: 2024-04-05

## 2024-04-12 ENCOUNTER — APPOINTMENT (OUTPATIENT)
Dept: CARDIOLOGY | Facility: CLINIC | Age: 59
End: 2024-04-12
Payer: MEDICARE

## 2024-04-12 VITALS — SYSTOLIC BLOOD PRESSURE: 140 MMHG | DIASTOLIC BLOOD PRESSURE: 80 MMHG

## 2024-04-12 VITALS
SYSTOLIC BLOOD PRESSURE: 186 MMHG | WEIGHT: 172 LBS | BODY MASS INDEX: 31.65 KG/M2 | HEIGHT: 62 IN | HEART RATE: 72 BPM | DIASTOLIC BLOOD PRESSURE: 106 MMHG | OXYGEN SATURATION: 97 %

## 2024-04-12 PROCEDURE — 93000 ELECTROCARDIOGRAM COMPLETE: CPT

## 2024-04-12 PROCEDURE — 99215 OFFICE O/P EST HI 40 MIN: CPT

## 2024-04-12 PROCEDURE — G2211 COMPLEX E/M VISIT ADD ON: CPT

## 2024-04-12 RX ORDER — DILTIAZEM HYDROCHLORIDE 240 MG/1
240 CAPSULE, EXTENDED RELEASE ORAL
Qty: 90 | Refills: 3 | Status: ACTIVE | COMMUNITY
Start: 2024-04-03 | End: 1900-01-01

## 2024-04-12 NOTE — CARDIOLOGY SUMMARY
[de-identified] : sinus rhythm  [de-identified] : 3/2024 LVEF 70-75% hyperdynamic LV, concentric LVH, mild MR, mild-mod TR

## 2024-04-12 NOTE — PHYSICAL EXAM
[Well Developed] : well developed [Well Nourished] : well nourished [No Acute Distress] : no acute distress [Normal Conjunctiva] : normal conjunctiva [Normal Venous Pressure] : normal venous pressure [No Carotid Bruit] : no carotid bruit [No Murmur] : no murmur [No Rub] : no rub [No Gallop] : no gallop [Clear Lung Fields] : clear lung fields [Good Air Entry] : good air entry [No Respiratory Distress] : no respiratory distress  [Soft] : abdomen soft [Non Tender] : non-tender [No Masses/organomegaly] : no masses/organomegaly [Normal Bowel Sounds] : normal bowel sounds [Normal Gait] : normal gait [No Edema] : no edema [No Cyanosis] : no cyanosis [No Clubbing] : no clubbing [No Varicosities] : no varicosities [No Rash] : no rash [No Skin Lesions] : no skin lesions [Moves all extremities] : moves all extremities [No Focal Deficits] : no focal deficits [Normal Speech] : normal speech [Alert and Oriented] : alert and oriented [Normal memory] : normal memory [Normal S1, S2] : normal S1, S2 [Rhythm Regular] : regular [Normal S1] : normal S1 [Normal S2] : normal S2 [de-identified] : LFT AVF

## 2024-04-12 NOTE — REVIEW OF SYSTEMS
[Palpitations] : palpitations [Negative] : Heme/Lymph [SOB] : no shortness of breath [Dyspnea on exertion] : not dyspnea during exertion [Chest Discomfort] : no chest discomfort [FreeTextEntry5] : HPI

## 2024-04-12 NOTE — HISTORY OF PRESENT ILLNESS
[FreeTextEntry1] : Tom is a  59 year old man with a history of ESRD on HD MWF(uncontrolled HTN), renal transplant 3/18/14, 12/2014 failed renal transplant (on transplant list at [place]),left AV fistula, s/p stent to the fistula, obesity, hypertension, hyperlipidemia who presented to the office today.  He was seen in the office for an acute visit on 3/29/24,  Multiple EKGs done which showed AF in the 160s, symptomatic. We advised that he present to the ER for further mgmt but he refused. An IV was placed and he was given IVF and 5mg IVP of Metoprolol but his HR increased again to the 160s. (Of note, at a previous visit, he was here for a stress test and was found to be in atrial flutter to 150 and refused to go to the ER for DCCV. He did not want to start AC at that time.)  He than presented to [place] ED for further management.  He was given IV lopressor and IV diltiazem.  Blood work done demonstrated baseline chemistry and CBC however, TSH was <0.0. Since discharge, he had one episode of elevated HR while in the parking lot at [facility]. He was then prescribed cardizem 120mg daily. He is now on Eliquis 5mg BID and scheduled to see EP (Dr Alvarez) on 4/15/24  He is now also suffering from an extensive shingles break out.

## 2024-04-15 ENCOUNTER — APPOINTMENT (OUTPATIENT)
Dept: ELECTROPHYSIOLOGY | Facility: CLINIC | Age: 59
End: 2024-04-15
Payer: MEDICARE

## 2024-04-15 VITALS
OXYGEN SATURATION: 97 % | WEIGHT: 171 LBS | DIASTOLIC BLOOD PRESSURE: 98 MMHG | HEART RATE: 89 BPM | SYSTOLIC BLOOD PRESSURE: 191 MMHG | HEIGHT: 62 IN | BODY MASS INDEX: 31.47 KG/M2

## 2024-04-15 PROCEDURE — 93000 ELECTROCARDIOGRAM COMPLETE: CPT

## 2024-04-15 PROCEDURE — 99204 OFFICE O/P NEW MOD 45 MIN: CPT

## 2024-04-16 ENCOUNTER — NON-APPOINTMENT (OUTPATIENT)
Age: 59
End: 2024-04-16

## 2024-04-17 NOTE — DISCUSSION/SUMMARY
[FreeTextEntry1] : I couple days ago I think is okay this is a 59-year-old man with onset of rapid atrial fibrillation in the setting of untreated hyperthyroidism.  He will be given referral to endocrinology for further management of his condition.  Once euthyroid, he will follow-up with me for further assessment of atrial arrhythmia with management as needed.  He appeared to understand the whole discussion and verbalized that all of his questions were answered to his satisfaction.  Thank you for allowing me to be involved in the care of this pleasant man. Please feel free to contact me with any questions.    Kalpesh Remy MD  of Cardiology Electrophysiology Section [address] [place] [address] Office: [phone] Fax: [phone]

## 2024-04-17 NOTE — CARDIOLOGY SUMMARY
[de-identified] : 4/15/2024: Sinus rhythm [de-identified] : 3/7/24:  1. Left ventricular systolic function is hyperdynamic with an ejection fraction visually estimated at 70 to 75 %. There are no regional wall motion abnormalities seen. 2. Hyperdynamic left ventricular systolic function with intra-cavitary gradient of 25 mmHg. Basal septal hypertrophy with mild left ventricular outflow tract obstruction. 3. There is increased LV mass and concentric hypertrophy. 4. There is diastolic dysfunction of indeterminate grade. 5. Normal right ventricular cavity size and normal systolic function. 6. The left atrium is moderately dilated. 7. There is mild calcification of the mitral valve annulus. 8. Mild mitral regurgitation. 9. Trileaflet aortic valve with normal systolic excursion. 10. Mild to moderate tricuspid regurgitation. 11. Estimated pulmonary artery systolic pressure is 35 mmHg, consistent with borderline pulmonary hypertension. 12. No prior echocardiogram is available for comparison.

## 2024-04-17 NOTE — HISTORY OF PRESENT ILLNESS
[FreeTextEntry1] : This is a 59-year-old man with a history of hypertension, end-stage renal disease on dialysis, renal transplant 2014 with failed graft, obesity, HTN and hyperlipidemia.He initially presented in January 2024 after developing palpitations and dialysis clinic.  By the time of his arrival in the emergency room he was back in sinus rhythm.  He was seen in cardiology clinic and scheduled for stress testing; upon his arrival he was in rapid atrial fibrillation.  He was again subsequently in atrial fibrillation and sent to the emergency room.  There, TSH was drawn but presumably not seen as it returned less than assay.  He presents today for further management of this arrhythmia.  He has ongoing episodic palpitations, though improved on high doses of diltiazem and carvedilol.  He has no chest pain, dizziness or syncope.  2-week event monitoring (2/24) showed rare atrial extrasystoles but no atrial fibrillation.

## 2024-04-19 ENCOUNTER — APPOINTMENT (OUTPATIENT)
Dept: ENDOCRINOLOGY | Facility: CLINIC | Age: 59
End: 2024-04-19
Payer: MEDICARE

## 2024-04-19 ENCOUNTER — APPOINTMENT (OUTPATIENT)
Dept: CARDIOLOGY | Facility: CLINIC | Age: 59
End: 2024-04-19

## 2024-04-19 VITALS
RESPIRATION RATE: 17 BRPM | WEIGHT: 170 LBS | TEMPERATURE: 98.3 F | OXYGEN SATURATION: 99 % | DIASTOLIC BLOOD PRESSURE: 84 MMHG | HEIGHT: 62 IN | HEART RATE: 82 BPM | BODY MASS INDEX: 31.28 KG/M2 | SYSTOLIC BLOOD PRESSURE: 158 MMHG

## 2024-04-19 PROCEDURE — 99204 OFFICE O/P NEW MOD 45 MIN: CPT

## 2024-04-20 LAB
ALBUMIN SERPL ELPH-MCNC: 4 G/DL
ALP BLD-CCNC: 103 U/L
ALT SERPL-CCNC: 29 U/L
ANION GAP SERPL CALC-SCNC: 13 MMOL/L
AST SERPL-CCNC: 18 U/L
BILIRUB SERPL-MCNC: 0.4 MG/DL
BUN SERPL-MCNC: 14 MG/DL
CALCIUM SERPL-MCNC: 9 MG/DL
CHLORIDE SERPL-SCNC: 100 MMOL/L
CO2 SERPL-SCNC: 28 MMOL/L
CREAT SERPL-MCNC: 5.22 MG/DL
EGFR: 12 ML/MIN/1.73M2
ERYTHROCYTE [SEDIMENTATION RATE] IN BLOOD BY WESTERGREN METHOD: 77 MM/HR
GLUCOSE SERPL-MCNC: 158 MG/DL
HCT VFR BLD CALC: 30.7 %
HGB BLD-MCNC: 9.8 G/DL
MCHC RBC-ENTMCNC: 30.5 PG
MCHC RBC-ENTMCNC: 31.9 GM/DL
MCV RBC AUTO: 95.6 FL
PLATELET # BLD AUTO: 205 K/UL
POTASSIUM SERPL-SCNC: 3.8 MMOL/L
PROT SERPL-MCNC: 7 G/DL
RBC # BLD: 3.21 M/UL
RBC # FLD: 15.8 %
SODIUM SERPL-SCNC: 140 MMOL/L
T3 SERPL-MCNC: 135 NG/DL
T3FREE SERPL-MCNC: 3.29 PG/ML
T4 FREE SERPL-MCNC: 1.3 NG/DL
TSH SERPL-ACNC: <0.01 UIU/ML
WBC # FLD AUTO: 6.52 K/UL

## 2024-04-22 LAB
THYROGLOB AB SERPL-ACNC: <20 IU/ML
THYROGLOB SERPL-MCNC: 79.8 NG/ML
THYROPEROXIDASE AB SERPL IA-ACNC: 27.3 IU/ML

## 2024-04-23 ENCOUNTER — APPOINTMENT (OUTPATIENT)
Dept: CARDIOLOGY | Facility: CLINIC | Age: 59
End: 2024-04-23

## 2024-04-23 LAB
TSH RECEPTOR AB: 12 IU/L
TSI ACT/NOR SER: 8.85 IU/L

## 2024-04-25 ENCOUNTER — NON-APPOINTMENT (OUTPATIENT)
Age: 59
End: 2024-04-25

## 2024-04-25 ENCOUNTER — APPOINTMENT (OUTPATIENT)
Dept: CARDIOLOGY | Facility: CLINIC | Age: 59
End: 2024-04-25
Payer: MEDICARE

## 2024-04-25 VITALS
DIASTOLIC BLOOD PRESSURE: 98 MMHG | HEIGHT: 62 IN | SYSTOLIC BLOOD PRESSURE: 168 MMHG | OXYGEN SATURATION: 97 % | HEART RATE: 67 BPM

## 2024-04-25 DIAGNOSIS — I48.0 PAROXYSMAL ATRIAL FIBRILLATION: ICD-10-CM

## 2024-04-25 DIAGNOSIS — I10 ESSENTIAL (PRIMARY) HYPERTENSION: ICD-10-CM

## 2024-04-25 PROCEDURE — 93000 ELECTROCARDIOGRAM COMPLETE: CPT

## 2024-04-25 PROCEDURE — 99214 OFFICE O/P EST MOD 30 MIN: CPT

## 2024-04-25 PROCEDURE — G2211 COMPLEX E/M VISIT ADD ON: CPT

## 2024-04-25 RX ORDER — HYDRALAZINE HYDROCHLORIDE 50 MG/1
50 TABLET ORAL 3 TIMES DAILY
Qty: 90 | Refills: 3 | Status: ACTIVE | COMMUNITY
Start: 2024-03-26 | End: 1900-01-01

## 2024-04-25 NOTE — DISCUSSION/SUMMARY
[FreeTextEntry1] : This is a 59  year old man with a history of ESRD on HD MWF(uncontrolled HTN), renal transplant 3/18/14, 12/2014 failed renal transplant (on transplant list at [place]),left AV fistula, s/p stent to the fistula, obesity, hypertension, hyperlipidemia who presents to the office for a cardiac evaluation.  HE has been feeling his heart racing with HD at times.       At one of his last visits, he was here for a stress test, and was noted to be in AFL at 150.  He refused to go to the ED for cardioversion.  He was given carvedilol 25 bid.  He is now back in NSR.    He will continue carvedilol 25 bid and diltiazem 240 QD.  He  does not want to start blood thinners yet.  His BP is uncontrolled, and I am increasing hydralazine to 50 TID.  He has since been diagnosed with hyperthyroidism, likely the driving mechanism behind his arrhythmia.  He has seen endocrine, has had blood work, and awaits a thyroid US.  He is scheduled for follow up with them next week.   He has not noted an increased heart rate since up titration of his silvia agents.      I will see him back in one month.     [EKG obtained to assist in diagnosis and management of assessed problem(s)] : EKG obtained to assist in diagnosis and management of assessed problem(s)

## 2024-04-25 NOTE — HISTORY OF PRESENT ILLNESS
[FreeTextEntry1] : This is a 59  year old man with a history of ESRD on HD MWF(uncontrolled HTN), renal transplant 3/18/14, 12/2014 failed renal transplant (on transplant list at [place]),left AV fistula, s/p stent to the fistula, obesity, hypertension, hyperlipidemia who presents to the office for a cardiac evaluation.  HE has been feeling his heart racing with HD at times.   He was taken off the machine, and sent home, at which point he was taken to [facility].  He had negative troponin, EKG, CK, CXR.  He had a positive d dimer, and had a negative CT PA for PE.  He was discharged with follow up.  He has not had any recurrent symptoms.     At one of his last visits, he was here for a stress test, and was noted to be in AFL at 150.  He refused to go to the ED for cardioversion.  He was given carvedilol 25 bid.  He is now back in NSR.      He just completed a 2 week O ambulatory rhythm monitor that did not reveal any episodes of atrial fibrillation.  He has since been diagnosed with hyperthyroidism, likely the driving mechanism behind his arrhythmia.  He has seen endocrine, has had blood work, and awaits a thyroid US.  He is scheduled for follow up with them next week.  His BP remains uncontrolled.  He has not noted an increased heart rate since up titration of his silvia agents.

## 2024-05-03 ENCOUNTER — APPOINTMENT (OUTPATIENT)
Dept: ENDOCRINOLOGY | Facility: CLINIC | Age: 59
End: 2024-05-03
Payer: MEDICARE

## 2024-05-03 DIAGNOSIS — N18.6 END STAGE RENAL DISEASE: ICD-10-CM

## 2024-05-03 DIAGNOSIS — Z99.2 DEPENDENCE ON RENAL DIALYSIS: ICD-10-CM

## 2024-05-03 DIAGNOSIS — I89.9 NONINFECTIVE DISORDER OF LYMPHATIC VESSELS AND LYMPH NODES, UNSPECIFIED: ICD-10-CM

## 2024-05-03 DIAGNOSIS — E05.90 THYROTOXICOSIS, UNSPECIFIED W/OUT THYROTOXIC CRISIS OR STORM: ICD-10-CM

## 2024-05-03 DIAGNOSIS — E05.00 THYROTOXICOSIS WITH DIFFUSE GOITER W/OUT THYROTOXIC CRISIS OR STORM: ICD-10-CM

## 2024-05-03 PROCEDURE — 99215 OFFICE O/P EST HI 40 MIN: CPT

## 2024-05-03 RX ORDER — METHIMAZOLE 5 MG/1
5 TABLET ORAL
Qty: 45 | Refills: 2 | Status: ACTIVE | COMMUNITY
Start: 2024-05-03 | End: 1900-01-01

## 2024-05-03 NOTE — PHYSICAL EXAM
[de-identified] : General: No distress, well nourished Eyes: Normal external appearance ENT: Normal appearance of the nose Neck/Thyroid: No visible neck swelling Respiratory: No use of accessory muscles of respiration Psychiatry: Patient converses normally, good judgement and insight Skin: No rashes seen on face

## 2024-05-03 NOTE — REASON FOR VISIT
[Home] : at home, [unfilled] , at the time of the visit. [Medical Office: ([facility])___] : at the medical office located in  [Patient] : the patient [Follow - Up] : a follow-up visit [Hyperthyroidism] : hyperthyroidism

## 2024-05-03 NOTE — DATA REVIEWED
[FreeTextEntry1] : 04/26/2024 - US thyroid - right lobe measures 4.56 cm and left lobe measures 4.44 cm - no nodules seen, normal vascular flow, in the left neck at level V, there are multiple lymph nodes with thickened cortices and no fatty hilum noted measuring up to 1.6 cm in size.

## 2024-05-03 NOTE — HISTORY OF PRESENT ILLNESS
[FreeTextEntry1] : Problems: 1. Subclinical hyperthyroidism due to Graves' disease  NOTE - patient has ESRD (due to Hypertension) and is on hemodialysis - Monday, Wednesday and Friday via AV fistula PATIENT ALSO HAS ATRIAL FIBRILLATION  Subclinical hyperthyroidism due to Graves' disease 1. Diagnosed with thyrotoxicosis in March 2024 as TSH was undetectable - patient was seen the ER then for atrial fibrillation In April 2024, he was diagnosed with subclinical hyperthyroidism due to Graves' disease as TSH-R abs were elevated at 12 (0-1.75).  2. Labs: 03/29/2024 - TSH 0 04/19/2024 - Cr N, AST N, ALT N, WBC N, Hb 9.8 (low), plt N, ESR 77 (0-20), thyroglobulin 79.8 (1.6 to 59.9), thyroglobulin antibodies < 20 N, TPO antibodies neg, TSI 8.85 (0-0.55), TSH-R abs 12 (0-1.75) 3. Radiology: 04/26/2024 - US thyroid - right lobe measures 4.56 cm and left lobe measures 4.44 cm - no nodules seen, normal vascular flow, in the left neck at level V, there are multiple lymph nodes with thickened cortices and no fatty hilum noted measuring up to 1.6 cm in size.  4. No family history of thyroid disorder or thyroid cancer, no personal history of radiation treatment 5. No Graves' orbitopathy or dermopathy 6. Symptoms on 04/19/2024 - patient lost 30 pounds from 2023 to April 2024 7. Meds: Labetalol 200 mg po bid - prescribed by Cardiology Carvedilol 25 mg po bid - prescribed by Cardiology

## 2024-05-03 NOTE — ASSESSMENT
[FreeTextEntry1] : This patient was diagnosed with atrial fibrillation in March 2024 and was found to have a suppressed TSH on 03/29/2024. In April 2024, he was diagnosed with subclinical hyperthyroidism due to Graves' disease as his TSH-R ab level was elevated at 12 (0-1.75)  He is on beta blockers for his atrial fibrillation that is prescribed by Cardiology.  I discussed treatment with radioactive iodine therapy versus methimazole on 05/03/2024 and the patient decided to use methimazole. Of note, if he uses radioactive iodine therapy in the future, this will need to be timed with his hemodialysis.  I prescribe methimazole on 05/03/2024.   Patient advised that methimazole may cause agranulocytosis and hepatic injury - the patient is to stop methimazole and contact me urgently or go to an Urgent care facility if the patient develops fever, sore throat or jaundice.    Plan: 1. Start methimazole 5 mg po every other day 2. Refer to Head and neck surgery re abnormal lymph nodes 3. Labs to be done in 2 months - see below 4. Follow up in 2 months to review results - telehealth visit ok.

## 2024-05-06 ENCOUNTER — APPOINTMENT (OUTPATIENT)
Dept: ELECTROPHYSIOLOGY | Facility: CLINIC | Age: 59
End: 2024-05-06

## 2024-05-21 ENCOUNTER — APPOINTMENT (OUTPATIENT)
Dept: CARDIOLOGY | Facility: CLINIC | Age: 59
End: 2024-05-21
Payer: MEDICARE

## 2024-05-21 ENCOUNTER — NON-APPOINTMENT (OUTPATIENT)
Age: 59
End: 2024-05-21

## 2024-05-21 VITALS
BODY MASS INDEX: 31.28 KG/M2 | HEART RATE: 92 BPM | DIASTOLIC BLOOD PRESSURE: 90 MMHG | OXYGEN SATURATION: 98 % | WEIGHT: 170 LBS | HEIGHT: 62 IN | SYSTOLIC BLOOD PRESSURE: 157 MMHG

## 2024-05-21 VITALS — DIASTOLIC BLOOD PRESSURE: 70 MMHG | SYSTOLIC BLOOD PRESSURE: 138 MMHG

## 2024-05-21 DIAGNOSIS — I48.91 UNSPECIFIED ATRIAL FIBRILLATION: ICD-10-CM

## 2024-05-21 DIAGNOSIS — E78.5 HYPERLIPIDEMIA, UNSPECIFIED: ICD-10-CM

## 2024-05-21 PROCEDURE — 99214 OFFICE O/P EST MOD 30 MIN: CPT

## 2024-05-21 PROCEDURE — G2211 COMPLEX E/M VISIT ADD ON: CPT

## 2024-05-21 PROCEDURE — 93000 ELECTROCARDIOGRAM COMPLETE: CPT

## 2024-05-21 RX ORDER — ZOLPIDEM TARTRATE 5 MG/1
5 TABLET ORAL
Refills: 0 | Status: ACTIVE | COMMUNITY
Start: 2024-05-21

## 2024-05-21 RX ORDER — GABAPENTIN 100 MG/1
100 CAPSULE ORAL
Refills: 0 | Status: ACTIVE | COMMUNITY
Start: 2024-05-21

## 2024-05-21 RX ORDER — ISOSORBIDE MONONITRATE 120 MG/1
120 TABLET, EXTENDED RELEASE ORAL
Qty: 90 | Refills: 3 | Status: ACTIVE | COMMUNITY
Start: 2024-05-21

## 2024-05-21 NOTE — PHYSICAL EXAM
[Well Developed] : well developed [Well Nourished] : well nourished [No Acute Distress] : no acute distress [Normal Conjunctiva] : normal conjunctiva [Normal Venous Pressure] : normal venous pressure [No Carotid Bruit] : no carotid bruit [Normal S1, S2] : normal S1, S2 [No Rub] : no rub [No Gallop] : no gallop [Normal Rate] : normal [Rhythm Regular] : regular [I] : a grade 1 [Right Carotid Bruit] : no bruit heard over the right carotid [Left Carotid Bruit] : no bruit heard over the left carotid [Clear Lung Fields] : clear lung fields [Good Air Entry] : good air entry [No Respiratory Distress] : no respiratory distress  [Soft] : abdomen soft [Non Tender] : non-tender [No Masses/organomegaly] : no masses/organomegaly [Normal Bowel Sounds] : normal bowel sounds [Normal Gait] : normal gait [No Edema] : no edema [No Cyanosis] : no cyanosis [No Clubbing] : no clubbing [No Varicosities] : no varicosities [No Rash] : no rash [No Skin Lesions] : no skin lesions [Moves all extremities] : moves all extremities [No Focal Deficits] : no focal deficits [Normal Speech] : normal speech [Alert and Oriented] : alert and oriented [Normal memory] : normal memory [de-identified] : 1/6 systolic murmur

## 2024-05-21 NOTE — HISTORY OF PRESENT ILLNESS
[FreeTextEntry1] : This is a 59  year old man with a history of ESRD on HD MWF(uncontrolled HTN), renal transplant 3/18/14, 12/2014 failed renal transplant (on transplant list at [place]),left AV fistula, s/p stent to the fistula, obesity, hypertension, hyperlipidemia who presents to the office for a cardiac evaluation.  HE has been feeling his heart racing with HD at times.   He was taken off the machine, and sent home, at which point he was taken to [facility].  He had negative troponin, EKG, CK, CXR.  He had a positive d dimer, and had a negative CT PA for PE.  He was discharged with follow up.  He has not had any recurrent symptoms.     At one of his last visits, he was here for a stress test, and was noted to be in AFL at 150.  He refused to go to the ED for cardioversion.  He was given carvedilol 25 bid.  He is now back in NSR.      He just completed a 2 week O ambulatory rhythm monitor that did not reveal any episodes of atrial fibrillation.  He has since been diagnosed with hyperthyroidism, likely the driving mechanism behind his arrhythmia.  He has seen endocrine, and is on methimazole.  He needs to follow up with them regarding a treatment plan.  His BP is better controlled.  AT times it is high when he starts HD.

## 2024-05-21 NOTE — DISCUSSION/SUMMARY
[FreeTextEntry1] : This is a 59  year old man with a history of ESRD on HD MWF(uncontrolled HTN), renal transplant 3/18/14, 12/2014 failed renal transplant (on transplant list at [place]),left AV fistula, s/p stent to the fistula, obesity, hypertension, hyperlipidemia who presents to the office for a cardiac evaluation.  HE has been feeling his heart racing with HD at times.       At one of his last visits, he was here for a stress test, and was noted to be in AFL at 150.  He refused to go to the ED for cardioversion.  He was given carvedilol 25 bid.  He is now back in NSR.    He will continue carvedilol 25 bid and diltiazem 240 QD.  He agreed to start Eliquis, and will continue 5 bid.  His BP is better controlled.  He will continue hydralazine 50 TID.   I advised he can take an extra hydralazine when his BP is high at HD.  He has since been diagnosed with hyperthyroidism, likely the driving mechanism behind his arrhythmia.  He has seen endocrine, has had blood work, and awaits follow up.  He has started methimazole.  He will follow in three months.  He knows to call the office with any issues.  [EKG obtained to assist in diagnosis and management of assessed problem(s)] : EKG obtained to assist in diagnosis and management of assessed problem(s)

## 2024-06-03 RX ORDER — APIXABAN 5 MG/1
5 TABLET, FILM COATED ORAL
Qty: 180 | Refills: 2 | Status: ACTIVE | COMMUNITY
Start: 2024-05-21

## 2024-06-06 ENCOUNTER — APPOINTMENT (OUTPATIENT)
Dept: SURGERY | Facility: CLINIC | Age: 59
End: 2024-06-06
Payer: MEDICARE

## 2024-06-06 ENCOUNTER — RESULT REVIEW (OUTPATIENT)
Age: 59
End: 2024-06-06

## 2024-06-06 VITALS
DIASTOLIC BLOOD PRESSURE: 87 MMHG | BODY MASS INDEX: 31.1 KG/M2 | WEIGHT: 169 LBS | HEIGHT: 62 IN | OXYGEN SATURATION: 98 % | HEART RATE: 69 BPM | SYSTOLIC BLOOD PRESSURE: 175 MMHG

## 2024-06-06 DIAGNOSIS — R59.0 LOCALIZED ENLARGED LYMPH NODES: ICD-10-CM

## 2024-06-06 PROCEDURE — 99204 OFFICE O/P NEW MOD 45 MIN: CPT

## 2024-06-06 PROCEDURE — G2211 COMPLEX E/M VISIT ADD ON: CPT

## 2024-06-07 NOTE — CONSULT LETTER
[Dear  ___] : Dear  [unfilled], [Consult Letter:] : I had the pleasure of evaluating your patient, [unfilled]. [Please see my note below.] : Please see my note below. [Sincerely,] : Sincerely, [Consult Closing:] : Thank you very much for allowing me to participate in the care of this patient.  If you have any questions, please do not hesitate to contact me. [FreeTextEntry3] : Keshia Carmen MD, FACS Assistant Professor of Surgery and Otolaryngology [facility]

## 2024-06-07 NOTE — REASON FOR VISIT
[Initial Consultation] : an initial consultation for [Other: _____] : [unfilled] [FreeTextEntry2] : Cervical adenopathy

## 2024-06-07 NOTE — ASSESSMENT
[FreeTextEntry1] : Patient with suspicious left level 5 cervical lymph nodes noted on thyroid ultrasound.  I do not appreciate a dominant suspicious lymph node on physical examination today.  I have recommended an ultrasound-guided fine-needle aspiration of the most suspicious appearing lymph node on ultrasound to rule out malignancy including lymphoproliferative disease.  The patient will contact my office to review results once performed.  Based on results, the need for further intervention will be determined. I have answered their questions to the best of my ability.

## 2024-06-07 NOTE — PHYSICAL EXAM
[de-identified] : Left level 5 small mobile nontender cervical lymph nodes.  No other appreciable cervical or supraclavicular adenopathy, trachea midline, thyroid without enlargement or palpable mass [Normal] : no neck adenopathy [de-identified] : Skin:  normal appearance.  no rash, nodules, vesicles, or erythema, Musculoskeletal:  full range of motion and no deformities appreciated Neurological:  grossly intact Psychiatric:  oriented to person, place and time with appropriate affect

## 2024-06-07 NOTE — HISTORY OF PRESENT ILLNESS
[de-identified] : Patient referred by Dr. Cortez for evaluation of left cervical adenopathy.  Patient diagnosed with atrial fibrillation 2 months ago subsequently diagnosed with Graves' disease.  Treated with methimazole for 2 weeks.  Denies prior history of thyroid disease, dysphagia, change in voice or radiation exposure.  Thyroid ultrasound April 2024: Right lobe 4.5 x 1.4 x 1.9 cm, left lobe 4.4 x 1.3 x 1.4 cm.  No nodules or cysts noted.  Multiple left level 5 lymph nodes with thickened cortex without fatty hilum up to 1.6 cm noted.  Patient denies recent infection except shingles over right posterior shoulder underarm to anterior chest.  Patient denies fever, chills, sweats, change in weight, history of skin cancer or scalp irritation.    I have reviewed all old and new data and available images.

## 2024-06-12 ENCOUNTER — NON-APPOINTMENT (OUTPATIENT)
Age: 59
End: 2024-06-12

## 2024-06-14 ENCOUNTER — APPOINTMENT (OUTPATIENT)
Dept: CARDIOLOGY | Facility: CLINIC | Age: 59
End: 2024-06-14
Payer: MEDICARE

## 2024-06-14 PROCEDURE — A9500: CPT

## 2024-06-14 PROCEDURE — 93015 CV STRESS TEST SUPVJ I&R: CPT

## 2024-06-14 PROCEDURE — 78452 HT MUSCLE IMAGE SPECT MULT: CPT

## 2024-06-19 ENCOUNTER — APPOINTMENT (OUTPATIENT)
Dept: ULTRASOUND IMAGING | Facility: IMAGING CENTER | Age: 59
End: 2024-06-19
Payer: MEDICARE

## 2024-06-19 ENCOUNTER — OUTPATIENT (OUTPATIENT)
Dept: OUTPATIENT SERVICES | Facility: HOSPITAL | Age: 59
LOS: 1 days | End: 2024-06-19
Payer: MEDICARE

## 2024-06-19 ENCOUNTER — RESULT REVIEW (OUTPATIENT)
Age: 59
End: 2024-06-19

## 2024-06-19 DIAGNOSIS — R59.0 LOCALIZED ENLARGED LYMPH NODES: ICD-10-CM

## 2024-06-19 PROCEDURE — 88305 TISSUE EXAM BY PATHOLOGIST: CPT

## 2024-06-19 PROCEDURE — 88184 FLOWCYTOMETRY/ TC 1 MARKER: CPT

## 2024-06-19 PROCEDURE — 88173 CYTOPATH EVAL FNA REPORT: CPT

## 2024-06-19 PROCEDURE — 88173 CYTOPATH EVAL FNA REPORT: CPT | Mod: 26

## 2024-06-19 PROCEDURE — 10005 FNA BX W/US GDN 1ST LES: CPT

## 2024-06-19 PROCEDURE — 88108 CYTOPATH CONCENTRATE TECH: CPT | Mod: 26,59

## 2024-06-19 PROCEDURE — 87205 SMEAR GRAM STAIN: CPT

## 2024-06-19 PROCEDURE — 88305 TISSUE EXAM BY PATHOLOGIST: CPT | Mod: 26

## 2024-06-19 PROCEDURE — 88185 FLOWCYTOMETRY/TC ADD-ON: CPT

## 2024-06-19 PROCEDURE — 88189 FLOWCYTOMETRY/READ 16 & >: CPT

## 2024-06-19 PROCEDURE — 88172 CYTP DX EVAL FNA 1ST EA SITE: CPT

## 2024-06-20 LAB — TM INTERPRETATION: SIGNIFICANT CHANGE UP

## 2024-06-24 LAB — NON-GYNECOLOGICAL CYTOLOGY STUDY: SIGNIFICANT CHANGE UP

## 2024-06-25 ENCOUNTER — NON-APPOINTMENT (OUTPATIENT)
Age: 59
End: 2024-06-25

## 2024-08-26 ENCOUNTER — APPOINTMENT (OUTPATIENT)
Dept: CARDIOLOGY | Facility: CLINIC | Age: 59
End: 2024-08-26

## 2024-09-25 ENCOUNTER — APPOINTMENT (OUTPATIENT)
Dept: CARDIOLOGY | Facility: CLINIC | Age: 59
End: 2024-09-25
Payer: MEDICARE

## 2024-09-25 ENCOUNTER — APPOINTMENT (OUTPATIENT)
Dept: CARDIOLOGY | Facility: CLINIC | Age: 59
End: 2024-09-25

## 2024-09-25 ENCOUNTER — NON-APPOINTMENT (OUTPATIENT)
Age: 59
End: 2024-09-25

## 2024-09-25 VITALS
HEIGHT: 62 IN | OXYGEN SATURATION: 96 % | DIASTOLIC BLOOD PRESSURE: 87 MMHG | SYSTOLIC BLOOD PRESSURE: 106 MMHG | HEART RATE: 85 BPM

## 2024-09-25 DIAGNOSIS — I48.0 PAROXYSMAL ATRIAL FIBRILLATION: ICD-10-CM

## 2024-09-25 DIAGNOSIS — I10 ESSENTIAL (PRIMARY) HYPERTENSION: ICD-10-CM

## 2024-09-25 PROCEDURE — 99215 OFFICE O/P EST HI 40 MIN: CPT

## 2024-09-25 PROCEDURE — G2211 COMPLEX E/M VISIT ADD ON: CPT

## 2024-09-25 PROCEDURE — 93000 ELECTROCARDIOGRAM COMPLETE: CPT

## 2024-09-25 NOTE — PHYSICAL EXAM
[Well Developed] : well developed [Well Nourished] : well nourished [No Acute Distress] : no acute distress [Normal Conjunctiva] : normal conjunctiva [Normal Venous Pressure] : normal venous pressure [No Carotid Bruit] : no carotid bruit [Normal S1, S2] : normal S1, S2 [No Rub] : no rub [No Gallop] : no gallop [Normal Rate] : normal [Rhythm Regular] : regular [I] : a grade 1 [Right Carotid Bruit] : no bruit heard over the right carotid [Left Carotid Bruit] : no bruit heard over the left carotid [Clear Lung Fields] : clear lung fields [Good Air Entry] : good air entry [No Respiratory Distress] : no respiratory distress  [Soft] : abdomen soft [Non Tender] : non-tender [No Masses/organomegaly] : no masses/organomegaly [Normal Bowel Sounds] : normal bowel sounds [Normal Gait] : normal gait [No Edema] : no edema [No Cyanosis] : no cyanosis [No Clubbing] : no clubbing [No Varicosities] : no varicosities [No Rash] : no rash [No Skin Lesions] : no skin lesions [Moves all extremities] : moves all extremities [No Focal Deficits] : no focal deficits [Normal Speech] : normal speech [Alert and Oriented] : alert and oriented [Normal memory] : normal memory [de-identified] : 1/6 systolic murmur

## 2024-09-25 NOTE — HISTORY OF PRESENT ILLNESS
[FreeTextEntry1] : This is a 59  year old man with a history of ESRD on HD MWF(uncontrolled HTN), renal transplant 3/18/14, 12/2014 failed renal transplant (on transplant list at [place]),left AV fistula, s/p stent to the fistula, obesity, hypertension, hyperlipidemia who presents to the office for a cardiac evaluation.  HE had been feeling his heart racing with HD at times.   He was taken off the machine, and sent home, at which point he was taken to [facility].  He had negative troponin, EKG, CK, CXR.  He had a positive d dimer, and had a negative CT PA for PE.  He was discharged with follow up.  He has not had any recurrent symptoms.     At one of his last visits, he was here for a stress test, and was noted to be in AFL at 150.  He refused to go to the ED for cardioversion.  He was given carvedilol 25 bid.  He is now back in NSR.      He has since been diagnosed with hyperthyroidism, likely the driving mechanism behind his arrhythmia.  He has seen endocrine, and is on methimazole.  He had a thyroid biopsy, and there was no evidence of malignancy.  He is taking methimazole.  HE just had diverticultis.   He has since been getting very dizzy and lightheaded.  BP has been low.  He was above his dry weight at HD this AM.

## 2024-09-25 NOTE — DISCUSSION/SUMMARY
[FreeTextEntry1] : This is a 59  year old man with a history of ESRD on HD MWF(uncontrolled HTN), renal transplant 3/18/14, 12/2014 failed renal transplant (on transplant list at [place]),left AV fistula, s/p stent to the fistula, obesity, hypertension, hyperlipidemia who presents to the office for a cardiac evaluation.  HE had been feeling his heart racing with HD at times.       At one of his last visits, he was here for a stress test, and was noted to be in AFL at 150.  He refused to go to the ED for cardioversion.  He was given carvedilol 25 bid.  He is now back in NSR.      He agreed to start Eliquis, and will continue 5 bid.   He will continue hydralazine 50 TID.    He has since been diagnosed with hyperthyroidism, likely the driving mechanism behind his arrhythmia.  He has seen endocrine, and is on methimazole.  He needs to follow with them.  BP is now low, and he is dizzy.  I am reducing diltiazem to 180QD.  He will continue carvedilol 25 bid.  He will follow in about six weeks.    He knows to call the office with any issues.  [EKG obtained to assist in diagnosis and management of assessed problem(s)] : EKG obtained to assist in diagnosis and management of assessed problem(s)

## 2024-10-08 ENCOUNTER — APPOINTMENT (OUTPATIENT)
Dept: TRANSPLANT | Facility: CLINIC | Age: 59
End: 2024-10-08

## 2024-10-08 ENCOUNTER — APPOINTMENT (OUTPATIENT)
Dept: NEPHROLOGY | Facility: CLINIC | Age: 59
End: 2024-10-08

## 2024-11-05 ENCOUNTER — APPOINTMENT (OUTPATIENT)
Dept: TRANSPLANT | Facility: CLINIC | Age: 59
End: 2024-11-05

## 2024-11-05 ENCOUNTER — APPOINTMENT (OUTPATIENT)
Dept: NEPHROLOGY | Facility: CLINIC | Age: 59
End: 2024-11-05

## 2024-11-08 ENCOUNTER — APPOINTMENT (OUTPATIENT)
Dept: CARDIOLOGY | Facility: CLINIC | Age: 59
End: 2024-11-08
Payer: MEDICARE

## 2024-11-08 ENCOUNTER — NON-APPOINTMENT (OUTPATIENT)
Age: 59
End: 2024-11-08

## 2024-11-08 VITALS
HEIGHT: 62 IN | BODY MASS INDEX: 31.28 KG/M2 | WEIGHT: 170 LBS | DIASTOLIC BLOOD PRESSURE: 88 MMHG | SYSTOLIC BLOOD PRESSURE: 124 MMHG | HEART RATE: 101 BPM | OXYGEN SATURATION: 98 %

## 2024-11-08 DIAGNOSIS — I48.0 PAROXYSMAL ATRIAL FIBRILLATION: ICD-10-CM

## 2024-11-08 PROCEDURE — 93000 ELECTROCARDIOGRAM COMPLETE: CPT

## 2024-11-08 PROCEDURE — 99215 OFFICE O/P EST HI 40 MIN: CPT

## 2024-11-08 PROCEDURE — G2211 COMPLEX E/M VISIT ADD ON: CPT

## 2024-11-11 ENCOUNTER — RX RENEWAL (OUTPATIENT)
Age: 59
End: 2024-11-11

## 2024-11-11 RX ORDER — HYDRALAZINE HYDROCHLORIDE 25 MG/1
25 TABLET ORAL
Qty: 270 | Refills: 3 | Status: ACTIVE | COMMUNITY
Start: 2024-11-11

## 2024-12-24 ENCOUNTER — APPOINTMENT (OUTPATIENT)
Dept: TRANSPLANT | Facility: CLINIC | Age: 59
End: 2024-12-24

## 2025-01-08 ENCOUNTER — NON-APPOINTMENT (OUTPATIENT)
Age: 60
End: 2025-01-08

## 2025-01-08 ENCOUNTER — APPOINTMENT (OUTPATIENT)
Dept: CARDIOLOGY | Facility: CLINIC | Age: 60
End: 2025-01-08
Payer: MEDICARE

## 2025-01-08 VITALS
OXYGEN SATURATION: 97 % | HEART RATE: 74 BPM | SYSTOLIC BLOOD PRESSURE: 144 MMHG | DIASTOLIC BLOOD PRESSURE: 91 MMHG | HEIGHT: 62 IN

## 2025-01-08 DIAGNOSIS — Z00.00 ENCOUNTER FOR GENERAL ADULT MEDICAL EXAMINATION W/OUT ABNORMAL FINDINGS: ICD-10-CM

## 2025-01-08 DIAGNOSIS — I10 ESSENTIAL (PRIMARY) HYPERTENSION: ICD-10-CM

## 2025-01-08 DIAGNOSIS — I48.0 PAROXYSMAL ATRIAL FIBRILLATION: ICD-10-CM

## 2025-01-08 PROCEDURE — 93000 ELECTROCARDIOGRAM COMPLETE: CPT

## 2025-01-08 PROCEDURE — G2211 COMPLEX E/M VISIT ADD ON: CPT

## 2025-01-08 PROCEDURE — 99215 OFFICE O/P EST HI 40 MIN: CPT

## 2025-01-12 LAB
ALBUMIN SERPL ELPH-MCNC: 4.7 G/DL
ALP BLD-CCNC: 121 U/L
ALT SERPL-CCNC: 18 U/L
ANION GAP SERPL CALC-SCNC: 16 MMOL/L
AST SERPL-CCNC: 18 U/L
BILIRUB SERPL-MCNC: 0.6 MG/DL
BUN SERPL-MCNC: 28 MG/DL
CALCIUM SERPL-MCNC: 9.5 MG/DL
CHLORIDE SERPL-SCNC: 95 MMOL/L
CHOLEST SERPL-MCNC: 136 MG/DL
CO2 SERPL-SCNC: 28 MMOL/L
CREAT SERPL-MCNC: 7.16 MG/DL
EGFR: 8 ML/MIN/1.73M2
ESTIMATED AVERAGE GLUCOSE: 105 MG/DL
GLUCOSE SERPL-MCNC: 89 MG/DL
HBA1C MFR BLD HPLC: 5.3 %
HCT VFR BLD CALC: 41.5 %
HDLC SERPL-MCNC: 45 MG/DL
HGB BLD-MCNC: 13.4 G/DL
LDLC SERPL CALC-MCNC: 73 MG/DL
MCHC RBC-ENTMCNC: 30 PG
MCHC RBC-ENTMCNC: 32.3 G/DL
MCV RBC AUTO: 93 FL
NONHDLC SERPL-MCNC: 91 MG/DL
PLATELET # BLD AUTO: 214 K/UL
POTASSIUM SERPL-SCNC: 4.6 MMOL/L
PROT SERPL-MCNC: 8.3 G/DL
RBC # BLD: 4.46 M/UL
RBC # FLD: 13.3 %
SODIUM SERPL-SCNC: 139 MMOL/L
T3FREE SERPL-MCNC: 6.85 PG/ML
T4 FREE SERPL-MCNC: 2.8 NG/DL
TRIGL SERPL-MCNC: 94 MG/DL
TSH SERPL-ACNC: <0.01 UIU/ML
WBC # FLD AUTO: 7.49 K/UL

## 2025-01-16 ENCOUNTER — APPOINTMENT (OUTPATIENT)
Dept: TRANSPLANT | Facility: CLINIC | Age: 60
End: 2025-01-16

## 2025-01-16 ENCOUNTER — APPOINTMENT (OUTPATIENT)
Dept: NEPHROLOGY | Facility: CLINIC | Age: 60
End: 2025-01-16

## 2025-02-13 ENCOUNTER — APPOINTMENT (OUTPATIENT)
Dept: CARDIOLOGY | Facility: CLINIC | Age: 60
End: 2025-02-13

## 2025-02-27 ENCOUNTER — APPOINTMENT (OUTPATIENT)
Dept: TRANSPLANT | Facility: CLINIC | Age: 60
End: 2025-02-27

## 2025-02-27 ENCOUNTER — APPOINTMENT (OUTPATIENT)
Dept: CARDIOLOGY | Facility: CLINIC | Age: 60
End: 2025-02-27

## 2025-02-27 ENCOUNTER — APPOINTMENT (OUTPATIENT)
Dept: NEPHROLOGY | Facility: CLINIC | Age: 60
End: 2025-02-27
Payer: COMMERCIAL

## 2025-02-27 VITALS
SYSTOLIC BLOOD PRESSURE: 185 MMHG | RESPIRATION RATE: 17 BRPM | OXYGEN SATURATION: 99 % | BODY MASS INDEX: 33.13 KG/M2 | HEART RATE: 62 BPM | DIASTOLIC BLOOD PRESSURE: 97 MMHG | TEMPERATURE: 98 F | HEIGHT: 62 IN | WEIGHT: 180 LBS

## 2025-02-27 DIAGNOSIS — Z01.818 ENCOUNTER FOR OTHER PREPROCEDURAL EXAMINATION: ICD-10-CM

## 2025-02-27 PROCEDURE — 99205 OFFICE O/P NEW HI 60 MIN: CPT

## 2025-02-27 PROCEDURE — 99214 OFFICE O/P EST MOD 30 MIN: CPT

## 2025-02-28 ENCOUNTER — RX RENEWAL (OUTPATIENT)
Age: 60
End: 2025-02-28

## 2025-02-28 PROBLEM — Z01.818 PRE-TRANSPLANT EVALUATION FOR KIDNEY TRANSPLANT: Status: ACTIVE | Noted: 2025-02-27

## 2025-03-03 ENCOUNTER — NON-APPOINTMENT (OUTPATIENT)
Age: 60
End: 2025-03-03

## 2025-03-03 ENCOUNTER — APPOINTMENT (OUTPATIENT)
Dept: CARDIOLOGY | Facility: CLINIC | Age: 60
End: 2025-03-03
Payer: MEDICARE

## 2025-03-03 VITALS
WEIGHT: 180 LBS | SYSTOLIC BLOOD PRESSURE: 173 MMHG | HEART RATE: 74 BPM | HEIGHT: 62 IN | BODY MASS INDEX: 33.13 KG/M2 | OXYGEN SATURATION: 98 % | DIASTOLIC BLOOD PRESSURE: 95 MMHG

## 2025-03-03 DIAGNOSIS — I48.0 PAROXYSMAL ATRIAL FIBRILLATION: ICD-10-CM

## 2025-03-03 DIAGNOSIS — R00.2 PALPITATIONS: ICD-10-CM

## 2025-03-03 LAB
ABORH: NORMAL
ABORH: NORMAL
ALBUMIN SERPL ELPH-MCNC: 4.1 G/DL
ALP BLD-CCNC: 136 U/L
ALT SERPL-CCNC: 11 U/L
ANION GAP SERPL CALC-SCNC: 17 MMOL/L
AST SERPL-CCNC: 13 U/L
BASOPHILS # BLD AUTO: 0.03 K/UL
BASOPHILS NFR BLD AUTO: 0.5 %
BILIRUB SERPL-MCNC: 0.4 MG/DL
BUN SERPL-MCNC: 41 MG/DL
C PEPTIDE SERPL-MCNC: 5.5 NG/ML
CALCIUM SERPL-MCNC: 8.6 MG/DL
CHLORIDE SERPL-SCNC: 98 MMOL/L
CHOLEST SERPL-MCNC: 200 MG/DL
CMV IGG SERPL QL: >10 U/ML
CMV IGG SERPL-IMP: POSITIVE
CO2 SERPL-SCNC: 24 MMOL/L
COVID-19 SPIKE DOMAIN ANTIBODY INTERPRETATION: POSITIVE
CREAT SERPL-MCNC: 9.34 MG/DL
EBV DNA SERPL NAA+PROBE-ACNC: NOT DETECTED IU/ML
EBV EA AB SER IA-ACNC: 6.53 U/ML
EBV EA AB TITR SER IF: POSITIVE
EBV EA IGG SER QL IA: >600 U/ML
EBV EA IGG SER-ACNC: NEGATIVE
EBV EA IGM SER IA-ACNC: POSITIVE
EBV PATRN SPEC IB-IMP: NORMAL
EBV VCA IGG SER IA-ACNC: >750 U/ML
EBV VCA IGM SER QL IA: >160 U/ML
EBVPCR LOG: NOT DETECTED LOG10IU/ML
EGFR: 6 ML/MIN/1.73M2
EOSINOPHIL # BLD AUTO: 0.34 K/UL
EOSINOPHIL NFR BLD AUTO: 5.8 %
EPSTEIN-BARR VIRUS CAPSID ANTIGEN IGG: POSITIVE
ESTIMATED AVERAGE GLUCOSE: 103 MG/DL
GLUCOSE SERPL-MCNC: 85 MG/DL
HBA1C MFR BLD HPLC: 5.2 %
HBV CORE IGG+IGM SER QL: NONREACTIVE
HBV SURFACE AB SER QL: REACTIVE
HBV SURFACE AB SERPL IA-ACNC: >1000 MIU/ML
HBV SURFACE AG SER QL: NONREACTIVE
HCT VFR BLD CALC: 34.9 %
HCV AB SER QL: NONREACTIVE
HCV S/CO RATIO: 0.11 S/CO
HDLC SERPL-MCNC: 61 MG/DL
HGB BLD-MCNC: 11.1 G/DL
HIV1+2 AB SPEC QL IA.RAPID: NONREACTIVE
HSV 1+2 IGG SER IA-IMP: NEGATIVE
HSV 1+2 IGG SER IA-IMP: POSITIVE
HSV1 IGG SER QL: 29.7 INDEX
HSV2 IGG SER QL: 0.09 INDEX
IMM GRANULOCYTES NFR BLD AUTO: 0.3 %
LDLC SERPL CALC-MCNC: 120 MG/DL
LYMPHOCYTES # BLD AUTO: 1.06 K/UL
LYMPHOCYTES NFR BLD AUTO: 18.1 %
MAGNESIUM SERPL-MCNC: 2.7 MG/DL
MAN DIFF?: NORMAL
MCHC RBC-ENTMCNC: 30.4 PG
MCHC RBC-ENTMCNC: 31.8 G/DL
MCV RBC AUTO: 95.6 FL
MONOCYTES # BLD AUTO: 0.5 K/UL
MONOCYTES NFR BLD AUTO: 8.5 %
NEUTROPHILS # BLD AUTO: 3.92 K/UL
NEUTROPHILS NFR BLD AUTO: 66.8 %
NONHDLC SERPL-MCNC: 139 MG/DL
PARATHYROID HORMONE INTACT: 427 PG/ML
PHOSPHATE SERPL-MCNC: 5 MG/DL
PLATELET # BLD AUTO: 210 K/UL
POTASSIUM SERPL-SCNC: 5.5 MMOL/L
PROT SERPL-MCNC: 7.5 G/DL
PSA SERPL-MCNC: 1.02 NG/ML
RBC # BLD: 3.65 M/UL
RBC # FLD: 14.6 %
RUBV IGG FLD-ACNC: 17.1 INDEX
RUBV IGG SER-IMP: POSITIVE
SARS-COV-2 AB SERPL IA-ACNC: >250 U/ML
SODIUM SERPL-SCNC: 139 MMOL/L
T GONDII AB SER-IMP: NEGATIVE
T GONDII IGG SER QL: <3 IU/ML
T PALLIDUM AB SER QL IA: NEGATIVE
TRIGL SERPL-MCNC: 105 MG/DL
URATE SERPL-MCNC: 4.9 MG/DL
VZV AB TITR SER: POSITIVE
VZV IGG SER IF-ACNC: 35.4 S/CO
WBC # FLD AUTO: 5.87 K/UL

## 2025-03-03 PROCEDURE — 99214 OFFICE O/P EST MOD 30 MIN: CPT

## 2025-03-03 PROCEDURE — 93000 ELECTROCARDIOGRAM COMPLETE: CPT

## 2025-03-03 PROCEDURE — G2211 COMPLEX E/M VISIT ADD ON: CPT

## 2025-03-07 ENCOUNTER — NON-APPOINTMENT (OUTPATIENT)
Age: 60
End: 2025-03-07

## 2025-03-07 LAB
M TB IFN-G BLD-IMP: NEGATIVE
QUANTIFERON TB PLUS MITOGEN MINUS NIL: 1.84 IU/ML
QUANTIFERON TB PLUS NIL: 0.02 IU/ML
QUANTIFERON TB PLUS TB1 MINUS NIL: 0 IU/ML
QUANTIFERON TB PLUS TB2 MINUS NIL: 0 IU/ML
STRONGYLOIDES AB SER IA-ACNC: POSITIVE

## 2025-03-18 ENCOUNTER — APPOINTMENT (OUTPATIENT)
Dept: CARDIOLOGY | Facility: CLINIC | Age: 60
End: 2025-03-18

## 2025-03-25 ENCOUNTER — NON-APPOINTMENT (OUTPATIENT)
Age: 60
End: 2025-03-25

## 2025-03-28 RX ORDER — HYDRALAZINE HYDROCHLORIDE 25 MG/1
25 TABLET ORAL
Qty: 180 | Refills: 3 | Status: ACTIVE | COMMUNITY
Start: 2025-03-28

## 2025-04-16 ENCOUNTER — OUTPATIENT (OUTPATIENT)
Dept: OUTPATIENT SERVICES | Facility: HOSPITAL | Age: 60
LOS: 1 days | End: 2025-04-16
Payer: MEDICARE

## 2025-04-16 ENCOUNTER — APPOINTMENT (OUTPATIENT)
Dept: PSYCHIATRY | Facility: CLINIC | Age: 60
End: 2025-04-16

## 2025-04-16 PROCEDURE — 90791 PSYCH DIAGNOSTIC EVALUATION: CPT | Mod: 95

## 2025-04-16 PROCEDURE — 90791 PSYCH DIAGNOSTIC EVALUATION: CPT

## 2025-04-17 ENCOUNTER — APPOINTMENT (OUTPATIENT)
Dept: CARDIOLOGY | Facility: CLINIC | Age: 60
End: 2025-04-17
Payer: COMMERCIAL

## 2025-04-17 ENCOUNTER — NON-APPOINTMENT (OUTPATIENT)
Age: 60
End: 2025-04-17

## 2025-04-17 VITALS — OXYGEN SATURATION: 96 % | HEART RATE: 75 BPM | BODY MASS INDEX: 36.4 KG/M2 | WEIGHT: 199 LBS

## 2025-04-17 VITALS — DIASTOLIC BLOOD PRESSURE: 95 MMHG | SYSTOLIC BLOOD PRESSURE: 155 MMHG

## 2025-04-17 DIAGNOSIS — I10 ESSENTIAL (PRIMARY) HYPERTENSION: ICD-10-CM

## 2025-04-17 DIAGNOSIS — N18.6 END STAGE RENAL DISEASE: ICD-10-CM

## 2025-04-17 DIAGNOSIS — I48.0 PAROXYSMAL ATRIAL FIBRILLATION: ICD-10-CM

## 2025-04-17 PROCEDURE — 93000 ELECTROCARDIOGRAM COMPLETE: CPT | Mod: NC

## 2025-04-17 PROCEDURE — 99214 OFFICE O/P EST MOD 30 MIN: CPT

## 2025-04-17 PROCEDURE — G2211 COMPLEX E/M VISIT ADD ON: CPT

## 2025-04-18 ENCOUNTER — APPOINTMENT (OUTPATIENT)
Dept: INFECTIOUS DISEASE | Facility: CLINIC | Age: 60
End: 2025-04-18
Payer: COMMERCIAL

## 2025-04-18 VITALS
HEART RATE: 88 BPM | SYSTOLIC BLOOD PRESSURE: 185 MMHG | OXYGEN SATURATION: 97 % | HEIGHT: 62 IN | BODY MASS INDEX: 34.48 KG/M2 | TEMPERATURE: 97.7 F | WEIGHT: 187.39 LBS | DIASTOLIC BLOOD PRESSURE: 91 MMHG

## 2025-04-18 DIAGNOSIS — B78.9 STRONGYLOIDIASIS, UNSPECIFIED: ICD-10-CM

## 2025-04-18 DIAGNOSIS — Z23 ENCOUNTER FOR IMMUNIZATION: ICD-10-CM

## 2025-04-18 DIAGNOSIS — Z01.818 ENCOUNTER FOR OTHER PREPROCEDURAL EXAMINATION: ICD-10-CM

## 2025-04-18 PROCEDURE — 99204 OFFICE O/P NEW MOD 45 MIN: CPT

## 2025-04-18 RX ORDER — IVERMECTIN 3 MG/1
3 TABLET ORAL DAILY
Qty: 12 | Refills: 0 | Status: ACTIVE | COMMUNITY
Start: 2025-04-18 | End: 1900-01-01

## 2025-04-22 ENCOUNTER — NON-APPOINTMENT (OUTPATIENT)
Age: 60
End: 2025-04-22

## 2025-04-30 DIAGNOSIS — Z76.82 AWAITING ORGAN TRANSPLANT STATUS: ICD-10-CM

## 2025-04-30 DIAGNOSIS — F43.23 ADJUSTMENT DISORDER WITH MIXED ANXIETY AND DEPRESSED MOOD: ICD-10-CM

## 2025-05-08 ENCOUNTER — APPOINTMENT (OUTPATIENT)
Dept: ENDOCRINOLOGY | Facility: CLINIC | Age: 60
End: 2025-05-08

## 2025-06-12 ENCOUNTER — APPOINTMENT (OUTPATIENT)
Dept: CARDIOLOGY | Facility: CLINIC | Age: 60
End: 2025-06-12
Payer: MEDICARE

## 2025-06-12 VITALS — HEART RATE: 80 BPM | HEIGHT: 62 IN | OXYGEN SATURATION: 97 %

## 2025-06-12 PROCEDURE — 93000 ELECTROCARDIOGRAM COMPLETE: CPT

## 2025-06-12 PROCEDURE — G2211 COMPLEX E/M VISIT ADD ON: CPT

## 2025-06-12 PROCEDURE — 99215 OFFICE O/P EST HI 40 MIN: CPT

## 2025-06-12 RX ORDER — VALSARTAN 160 MG/1
160 TABLET, COATED ORAL DAILY
Qty: 30 | Refills: 1 | Status: ACTIVE | COMMUNITY
Start: 2025-06-12

## 2025-06-13 ENCOUNTER — APPOINTMENT (OUTPATIENT)
Dept: CARDIOLOGY | Facility: CLINIC | Age: 60
End: 2025-06-13
Payer: MEDICARE

## 2025-06-13 PROCEDURE — 93306 TTE W/DOPPLER COMPLETE: CPT

## 2025-06-18 ENCOUNTER — NON-APPOINTMENT (OUTPATIENT)
Age: 60
End: 2025-06-18

## 2025-06-20 ENCOUNTER — APPOINTMENT (OUTPATIENT)
Dept: CARDIOLOGY | Facility: CLINIC | Age: 60
End: 2025-06-20

## 2025-06-20 PROCEDURE — 78452 HT MUSCLE IMAGE SPECT MULT: CPT

## 2025-06-20 PROCEDURE — 93015 CV STRESS TEST SUPVJ I&R: CPT

## 2025-06-20 PROCEDURE — A9500: CPT

## 2025-06-23 ENCOUNTER — NON-APPOINTMENT (OUTPATIENT)
Age: 60
End: 2025-06-23

## 2025-06-23 ENCOUNTER — RESULT REVIEW (OUTPATIENT)
Age: 60
End: 2025-06-23

## 2025-06-26 ENCOUNTER — TRANSCRIPTION ENCOUNTER (OUTPATIENT)
Age: 60
End: 2025-06-26

## 2025-07-01 ENCOUNTER — APPOINTMENT (OUTPATIENT)
Dept: ORTHOPEDIC SURGERY | Facility: CLINIC | Age: 60
End: 2025-07-01

## 2025-07-02 ENCOUNTER — APPOINTMENT (OUTPATIENT)
Dept: CT IMAGING | Facility: CLINIC | Age: 60
End: 2025-07-02
Payer: MEDICARE

## 2025-07-02 ENCOUNTER — APPOINTMENT (OUTPATIENT)
Dept: ORTHOPEDIC SURGERY | Facility: CLINIC | Age: 60
End: 2025-07-02
Payer: MEDICARE

## 2025-07-02 VITALS — BODY MASS INDEX: 34.41 KG/M2 | HEIGHT: 62 IN | WEIGHT: 187 LBS

## 2025-07-02 PROBLEM — M23.91 INTERNAL DERANGEMENT OF RIGHT KNEE: Status: ACTIVE | Noted: 2025-07-02

## 2025-07-02 PROCEDURE — 71250 CT THORAX DX C-: CPT

## 2025-07-02 PROCEDURE — 99203 OFFICE O/P NEW LOW 30 MIN: CPT

## 2025-07-02 PROCEDURE — 73564 X-RAY EXAM KNEE 4 OR MORE: CPT | Mod: RT

## 2025-07-02 PROCEDURE — 74176 CT ABD & PELVIS W/O CONTRAST: CPT

## 2025-07-11 ENCOUNTER — APPOINTMENT (OUTPATIENT)
Dept: MRI IMAGING | Facility: CLINIC | Age: 60
End: 2025-07-11
Payer: MEDICARE

## 2025-07-11 ENCOUNTER — NON-APPOINTMENT (OUTPATIENT)
Age: 60
End: 2025-07-11

## 2025-07-11 PROCEDURE — 73721 MRI JNT OF LWR EXTRE W/O DYE: CPT | Mod: RT

## 2025-07-14 ENCOUNTER — TRANSCRIPTION ENCOUNTER (OUTPATIENT)
Age: 60
End: 2025-07-14

## 2025-07-15 ENCOUNTER — APPOINTMENT (OUTPATIENT)
Dept: ORTHOPEDIC SURGERY | Facility: CLINIC | Age: 60
End: 2025-07-15

## 2025-07-21 ENCOUNTER — APPOINTMENT (OUTPATIENT)
Dept: ULTRASOUND IMAGING | Facility: CLINIC | Age: 60
End: 2025-07-21
Payer: COMMERCIAL

## 2025-07-21 PROCEDURE — 76775 US EXAM ABDO BACK WALL LIM: CPT

## 2025-07-22 DIAGNOSIS — Z23 ENCOUNTER FOR IMMUNIZATION: ICD-10-CM

## 2025-07-25 ENCOUNTER — APPOINTMENT (OUTPATIENT)
Dept: INFECTIOUS DISEASE | Facility: CLINIC | Age: 60
End: 2025-07-25
Payer: MEDICARE

## 2025-07-25 ENCOUNTER — MED ADMIN CHARGE (OUTPATIENT)
Age: 60
End: 2025-07-25

## 2025-07-25 PROCEDURE — 90750 HZV VACC RECOMBINANT IM: CPT | Mod: NC,GY

## 2025-07-25 PROCEDURE — 90471 IMMUNIZATION ADMIN: CPT

## 2025-07-30 ENCOUNTER — APPOINTMENT (OUTPATIENT)
Dept: ORTHOPEDIC SURGERY | Facility: CLINIC | Age: 60
End: 2025-07-30
Payer: MEDICARE

## 2025-07-30 VITALS — HEIGHT: 62 IN | BODY MASS INDEX: 34.41 KG/M2 | WEIGHT: 187 LBS

## 2025-07-30 DIAGNOSIS — M17.11 UNILATERAL PRIMARY OSTEOARTHRITIS, RIGHT KNEE: ICD-10-CM

## 2025-07-30 DIAGNOSIS — S80.01XD CONTUSION OF RIGHT KNEE, SUBSEQUENT ENCOUNTER: ICD-10-CM

## 2025-07-30 PROCEDURE — 99213 OFFICE O/P EST LOW 20 MIN: CPT

## 2025-08-08 ENCOUNTER — NON-APPOINTMENT (OUTPATIENT)
Age: 60
End: 2025-08-08

## 2025-08-20 ENCOUNTER — NON-APPOINTMENT (OUTPATIENT)
Age: 60
End: 2025-08-20